# Patient Record
Sex: FEMALE | Race: WHITE | NOT HISPANIC OR LATINO | Employment: FULL TIME | URBAN - METROPOLITAN AREA
[De-identification: names, ages, dates, MRNs, and addresses within clinical notes are randomized per-mention and may not be internally consistent; named-entity substitution may affect disease eponyms.]

---

## 2017-04-03 ENCOUNTER — TELEPHONE (OUTPATIENT)
Dept: NURSING | Facility: CLINIC | Age: 22
End: 2017-04-03

## 2017-04-03 DIAGNOSIS — N94.6 DYSMENORRHEA: ICD-10-CM

## 2017-04-03 RX ORDER — DESOGESTREL AND ETHINYL ESTRADIOL 0.15-0.03
KIT ORAL
Qty: 112 TABLET | Refills: 2 | Status: SHIPPED | OUTPATIENT
Start: 2017-04-03 | End: 2018-01-04

## 2017-04-03 NOTE — TELEPHONE ENCOUNTER
Pt calling she is trying to fill her OCP but her pharmacy is indicating it's too early. Pt is due to start a new pack today, she takes it continuously. Rx was sent on 11/10/16 for Apri 84 tabs/4rf, no note of taking continuously or 4 pack amount quantity (112 tabs) on Rx. Informed pt I would call to get it changed, pt verbalized understanding. Called pharmacy and spoke with Pharmacist (Sahil) who stated she would deactivate the Rx (the 11/10/16 one). New Rx sent to pharmacy for Apri, take one tablet by mouth once daily in a continuous fashion, 112 tabs/2rf (1 refill would only get her to October 2017 and she is not due for annual until November 2017).     POC at annual (11/10/16) with Dr. Rice:  Will start continuous birth control with current OCP; extra refill provided      Closing encounter.

## 2018-01-04 DIAGNOSIS — N94.6 DYSMENORRHEA: ICD-10-CM

## 2018-01-04 RX ORDER — DESOGESTREL AND ETHINYL ESTRADIOL 0.15-0.03
KIT ORAL
Qty: 28 TABLET | Refills: 0 | Status: SHIPPED | OUTPATIENT
Start: 2018-01-04 | End: 2018-02-08

## 2018-01-04 NOTE — TELEPHONE ENCOUNTER
APRI 0.15-30      Last Written Prescription Date:  4/3/17  Last Fill Quantity: 112,   # refills: 2  Last Office Visit: 11/10/16  Future Office visit:   none    Medication is being filled for 1 time refill only due to:  Patient needs to be seen because it has been more than one year since last visit.   Pt due for annual, no appt scheduled. One month extension sent per [facility] protocol.

## 2018-02-08 DIAGNOSIS — N94.6 DYSMENORRHEA: ICD-10-CM

## 2018-02-08 RX ORDER — DESOGESTREL AND ETHINYL ESTRADIOL 0.15-0.03
KIT ORAL
Qty: 84 TABLET | Refills: 0 | Status: SHIPPED | OUTPATIENT
Start: 2018-02-08 | End: 2018-02-23

## 2018-02-08 NOTE — TELEPHONE ENCOUNTER
APRI 0.15-30      Last Written Prescription Date:  1/4/18  Last Fill Quantity: 28,   # refills: 0  Last Office Visit: 11/10/16  Future Office visit:    Next 5 appointments (look out 90 days)     Feb 23, 2018  1:50 PM CST   PHYSICAL with Valarie Rice MD   [facility] [place] ([facility])    [address] [address]   [phone]                   Medication is being filled for 1 time refill only due to:  Patient needs to be seen because it has been more than one year since last visit.   Pt due for annual, appt scheduled,3 month supply sent for insurance purposes.

## 2018-02-23 ENCOUNTER — OFFICE VISIT (OUTPATIENT)
Dept: OBGYN | Facility: CLINIC | Age: 23
End: 2018-02-23
Payer: COMMERCIAL

## 2018-02-23 VITALS
HEIGHT: 67 IN | DIASTOLIC BLOOD PRESSURE: 68 MMHG | SYSTOLIC BLOOD PRESSURE: 110 MMHG | BODY MASS INDEX: 17.42 KG/M2 | WEIGHT: 111 LBS

## 2018-02-23 DIAGNOSIS — N94.6 DYSMENORRHEA: ICD-10-CM

## 2018-02-23 DIAGNOSIS — N60.02 BREAST CYST, LEFT: ICD-10-CM

## 2018-02-23 DIAGNOSIS — R63.6 UNDERWEIGHT: ICD-10-CM

## 2018-02-23 DIAGNOSIS — Z86.59 HISTORY OF EATING DISORDER: ICD-10-CM

## 2018-02-23 DIAGNOSIS — Z01.419 ENCOUNTER FOR GYNECOLOGICAL EXAMINATION WITHOUT ABNORMAL FINDING: Primary | ICD-10-CM

## 2018-02-23 PROCEDURE — 99395 PREV VISIT EST AGE 18-39: CPT | Performed by: OBSTETRICS & GYNECOLOGY

## 2018-02-23 RX ORDER — DESOGESTREL AND ETHINYL ESTRADIOL 0.15-0.03
KIT ORAL
Qty: 84 TABLET | Refills: 4 | Status: SHIPPED | OUTPATIENT
Start: 2018-02-23 | End: 2019-03-25

## 2018-02-23 ASSESSMENT — ANXIETY QUESTIONNAIRES
3. WORRYING TOO MUCH ABOUT DIFFERENT THINGS: SEVERAL DAYS
1. FEELING NERVOUS, ANXIOUS, OR ON EDGE: NOT AT ALL
5. BEING SO RESTLESS THAT IT IS HARD TO SIT STILL: NOT AT ALL
2. NOT BEING ABLE TO STOP OR CONTROL WORRYING: NOT AT ALL
IF YOU CHECKED OFF ANY PROBLEMS ON THIS QUESTIONNAIRE, HOW DIFFICULT HAVE THESE PROBLEMS MADE IT FOR YOU TO DO YOUR WORK, TAKE CARE OF THINGS AT HOME, OR GET ALONG WITH OTHER PEOPLE: NOT DIFFICULT AT ALL
7. FEELING AFRAID AS IF SOMETHING AWFUL MIGHT HAPPEN: NOT AT ALL
GAD7 TOTAL SCORE: 1
6. BECOMING EASILY ANNOYED OR IRRITABLE: NOT AT ALL

## 2018-02-23 ASSESSMENT — PATIENT HEALTH QUESTIONNAIRE - PHQ9: 5. POOR APPETITE OR OVEREATING: NOT AT ALL

## 2018-02-23 NOTE — MR AVS SNAPSHOT
After Visit Summary   2/23/2018    Rani Guan    MRN: [id number]           Patient Information     Date Of Birth          1995        Visit Information        Provider Department      2/23/2018 1:50 PM Valarie Rice MD [facility] [place]        Today's Diagnoses     Encounter for gynecological examination without abnormal finding    -  1    Dysmenorrhea        Breast cyst, left        Underweight        History of eating disorder          Care Instructions    Follow up with your primary care provider for your other medical problems.  Continue self breast exam.  Usual safety and preventative measures counseling done.  Weight gain encouraged.  Long discussion today regarding weight trend in the last 15 months.  Discussed history of eating disorder in the past and recommend reaching back out to [facility] regarding weight and healthy nutrition to balance increase in activity/exercise.  Last pap smear (2016) was normal.  No pap was obtained this year.  This was discussed with the patient and she agrees with the plan.  Left breast ultrasound ordered for breast cyst.          Follow-ups after your visit        Follow-up notes from your care team     Return in about 1 year (around 2/23/2019) for Annual Exam.      Future tests that were ordered for you today     Open Future Orders        Priority Expected Expires Ordered    US Breast Left Limited 1-3 Quadrants Routine  2/23/2019 2/23/2018            Who to contact     If you have questions or need follow up information about today's clinic visit or your schedule please contact [facility] [address] [facility] [place] directly at [phone].  Normal or non-critical lab and imaging results will be communicated to you by MyChart, letter or phone within 4 business days after the clinic has received the results. If you do not hear from us within 7 days, please contact the clinic through MyChart or phone. If you have a critical or abnormal lab  "result, we will notify you by phone as soon as possible.  Submit refill requests through [email or web address] or call your pharmacy and they will forward the refill request to us. Please allow 3 business days for your refill to be completed.          Additional Information About Your Visit        [employer] Information     [email or web address] lets you send messages to your doctor, view your test results, renew your prescriptions, schedule appointments and more. To sign up, go to [email or web address] . Click on \"Log in\" on the left side of the screen, which will take you to the Welcome page. Then click on \"Sign up Now\" on the right side of the page.     You will be asked to enter the access code listed below, as well as some personal information. Please follow the directions to create your username and password.     Your access code is: [email or web address]  Expires: 2018  2:38 PM     Your access code will  in 90 days. If you need help or a new code, please call your [place] clinic or [phone].        Care EveryWhere ID     This is your Care EveryWhere ID. This could be used by other organizations to access your [place] medical records  [id number]        Your Vitals Were     Height Last Period Breastfeeding? BMI (Body Mass Index)          5' 6.5\" (1.689 m) 2018 (Approximate) No 17.65 kg/m2         Blood Pressure from Last 3 Encounters:   18 110/68   11/10/16 120/74   16 102/68    Weight from Last 3 Encounters:   18 111 lb (50.3 kg)   11/10/16 149 lb (67.6 kg)   16 150 lb (68 kg)                 Today's Medication Changes          These changes are accurate as of 18  2:38 PM.  If you have any questions, ask your nurse or doctor.               These medicines have changed or have updated prescriptions.        Dose/Directions    desogestrel-ethinyl estradiol 0.15-30 MG-MCG per tablet   Commonly known as:  APRI   This may have changed:  See the new instructions.   Used for:  Dysmenorrhea   Changed " by:  Valarie Rice MD        TAKE 1 TABLET BY MOUTH ONCE DAILY IN A CONTINUOUS FASHION   Quantity:  84 tablet   Refills:  4            Where to get your medicines      These medications were sent to [facility]/pharmacy #[id number] - [place] - [address]  [address] [place] [address]     Phone:  [phone]     desogestrel-ethinyl estradiol 0.15-30 MG-MCG per tablet                Primary Care Provider Office Phone # Fax #    [facility] [place] [facility] [phone]       [facility] [address] [facility] [address]  [facility] [address]        Equal Access to Services     MACKENZIE GUTIERREZ : Hadii aad ku hadasho Socalixto, waaxda luqadaha, qaybta kaalmada adekianayada, michael hood. So [facility] [phone].    ATENCIÓN: Si habla español, tiene a matias disposición servicios gratuitos de asistencia lingüística. [facility] [phone].    We comply with applicable federal civil rights laws and [place] laws. We do not discriminate on the basis of race, color, national origin, age, disability, sex, sexual orientation, or gender identity.            Thank you!     Thank you for choosing [facility] [place]  for your care. Our goal is always to provide you with excellent care. Hearing back from our patients is one way we can continue to improve our services. Please take a few minutes to complete the written survey that you may receive in the mail after your visit with us. Thank you!             Your Updated Medication List - Protect others around you: Learn how to safely use, store and throw away your medicines at www.disposemymeds.org.          This list is accurate as of 2/23/18  2:38 PM.  Always use your most recent med list.                   Brand Name Dispense Instructions for use Diagnosis    desogestrel-ethinyl estradiol 0.15-30 MG-MCG per tablet    APRI    84 tablet    TAKE 1 TABLET BY MOUTH ONCE DAILY IN A CONTINUOUS FASHION    Dysmenorrhea

## 2018-02-23 NOTE — PATIENT INSTRUCTIONS
Follow up with your primary care provider for your other medical problems.  Continue self breast exam.  Usual safety and preventative measures counseling done.  Weight gain encouraged.  Long discussion today regarding weight trend in the last 15 months.  Discussed history of eating disorder in the past and recommend reaching back out to [facility] regarding weight and healthy nutrition to balance increase in activity/exercise.  Last pap smear (2016) was normal.  No pap was obtained this year.  This was discussed with the patient and she agrees with the plan.  Left breast ultrasound ordered for breast cyst.

## 2018-02-23 NOTE — PROGRESS NOTES
Rani is a 22 year old  female who presents for annual exam.     Besides routine health maintenance, she has no other health concerns today .    HPI:  Here today for yearly exam --doing well.  Regular, monthly menses x 5-6d on apri OCP.  Heavier bleeding for first couple of days and then tapers.  No intermenstrual bleeding/spotting.  No cramping.  +SA --no issues.   Denies bowel/bladder issues    Dating (boyfriend x 2yrs); senior year at [employer]!!  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  Parents/family have been concerned.  Has recently agreed to scale back on exercise/yoga sculpt.  Eating 3 meals per day --usually protein bar/yogurt for breakfast, niko with turkey, mustard, veggies for lunch and random for dinner --usually some sort of protein  -has considered re-visiting [facility] for nutrition therapy  -no SBE; unsure of how long breast lump has been present; no pain/tenderness  -declines STD screening today      GYNECOLOGIC HISTORY:    Patient's last menstrual period was 2018 (approximate).  Her current contraception method is: oral contraceptives.  She  reports that she has never smoked. She has never used smokeless tobacco.    Patient is sexually active.  STD testing offered?  Declined  Last PHQ-9 score on record =   PHQ-9 SCORE 2018   Total Score 1     Last GAD7 score on record =   NICOLE-7 SCORE 2018   Total Score 1     Alcohol Score = 5    HEALTH MAINTENANCE:  Cholesterol: (No results found for: CHOL NA  Last Mammo: NA, Result: not applicable, Next Mammo: NA   Pap: (  Lab Results   Component Value Date    PAP NIL 11/10/2016    11/10/16 WNL. yeast  Colonoscopy:  NA, Result: not applicable, Next Colonoscopy: NA years.  Dexa:   "NA    Health maintenance updated:  yes    HISTORY:  Obstetric History       T0      L0     SAB0   TAB0   Ectopic0   Multiple0   Live Births0           Patient Active Problem List   Diagnosis     Uses oral contraception     History of eating disorder     Underweight     Breast cyst, left     Past Surgical History:   Procedure Laterality Date     NO HISTORY OF SURGERY        Social History   Substance Use Topics     Smoking status: Never Smoker     Smokeless tobacco: Never Used     Alcohol use Not on file      Problem (# of Occurrences) Relation (Name,Age of Onset)    Hypertension (1) Other            Current Outpatient Prescriptions   Medication Sig     desogestrel-ethinyl estradiol (APRI) 0.15-30 MG-MCG per tablet TAKE 1 TABLET BY MOUTH ONCE DAILY IN A CONTINUOUS FASHION     [DISCONTINUED] APRI 0.15-30 MG-MCG per tablet TAKE 1 TABLET BY MOUTH ONCE DAILY IN A CONTINUOUS FASHION     No current facility-administered medications for this visit.      No Known Allergies    Past medical, surgical, social and family histories were reviewed and updated in EPIC.    ROS:   12 point review of systems negative other than symptoms noted below.    EXAM:  /68  Ht 5' 6.5\" (1.689 m)  Wt 111 lb (50.3 kg)  LMP 2018 (Approximate)  Breastfeeding? No  BMI 17.65 kg/m2   BMI: Body mass index is 17.65 kg/(m^2).    PHYSICAL EXAM:  Constitutional:  Appearance: Well nourished, well developed, alert, in no acute distress  Neck:  Lymph Nodes:  No lymphadenopathy present    Thyroid:  Gland size normal, nontender, no nodules or masses present  on palpation  Chest:  Respiratory Effort:  Breathing unlabored  Cardiovascular:    Heart: Auscultation:  Regular rate, normal rhythm, no murmurs present  Breasts: Inspection of Breasts:  No lymphadenopathy present., Palpation of Breasts and Axillae:  No masses present on palpation, no breast tenderness., Axillary Lymph Nodes:  No lymphadenopathy present. and No nodularity, " asymmetry or nipple discharge bilaterally.+2-3CM WELL CIRCUMSCRIBED, MOBILE MASS IN LEFT BREAST AT 5-6O'CLOCK  Gastrointestinal:   Abdominal Examination:  Abdomen nontender to palpation, tone normal without rigidity or guarding, no masses present, umbilicus without lesions   Liver and Spleen:  No hepatomegaly present, liver nontender to palpation    Hernias:  No hernias present  Lymphatic: Lymph Nodes:  No other lymphadenopathy present  Skin:  General Inspection:  No rashes present, no lesions present, no areas of  discoloration    Genitalia and Groin:  No rashes present, no lesions present, no areas of  discoloration, no masses present  Neurologic/Psychiatric:    Mental Status:  Oriented X3     Pelvic Exam:  External Genitalia:     Normal appearance for age, no discharge present, no tenderness present, no inflammatory lesions present, color normal  Vagina:     Normal vaginal vault without central or paravaginal defects, no discharge present, no inflammatory lesions present, no masses present  Bladder:     Nontender to palpation  Urethra:   Urethral Body:  Urethra palpation normal, urethra structural support normal   Urethral Meatus:  No erythema or lesions present  Cervix:     Appearance healthy, no lesions present, nontender to palpation, no bleeding present  Uterus:     Uterus: firm, normal sized and nontender, anteverted in position.   Adnexa:     No adnexal tenderness present, no adnexal masses present  Perineum:     Perineum within normal limits, no evidence of trauma, no rashes or skin lesions present  Anus:     Anus within normal limits, no hemorrhoids present  Inguinal Lymph Nodes:     No lymphadenopathy present  Pubic Hair:     Normal pubic hair distribution for age  Genitalia and Groin:     No rashes present, no lesions present, no areas of discoloration, no masses present      COUNSELING:   Reviewed preventive health counseling, as reflected in patient instructions  Special attention given to:         Regular exercise       Healthy diet/nutrition       Contraception    BMI: Body mass index is 17.65 kg/(m^2).      ASSESSMENT:  22 year old female with satisfactory annual exam.    ICD-10-CM    1. Encounter for gynecological examination without abnormal finding Z01.419    2. Dysmenorrhea N94.6 desogestrel-ethinyl estradiol (APRI) 0.15-30 MG-MCG per tablet   3. Breast cyst, left N60.02 US Breast Left Limited 1-3 Quadrants   4. Underweight R63.6    5. History of eating disorder Z86.59        PLAN:  Patient Instructions   Follow up with your primary care provider for your other medical problems.  Continue self breast exam.  Usual safety and preventative measures counseling done.  Weight gain encouraged.  Long discussion today regarding weight trend in the last 15 months.  Discussed history of eating disorder in the past and recommend reaching back out to [facility] regarding weight and healthy nutrition to balance increase in activity/exercise.  Last pap smear (2016) was normal.  No pap was obtained this year.  This was discussed with the patient and she agrees with the plan.  Left breast ultrasound ordered for breast cyst.      Valarie Rice MD

## 2018-02-24 ASSESSMENT — PATIENT HEALTH QUESTIONNAIRE - PHQ9: SUM OF ALL RESPONSES TO PHQ QUESTIONS 1-9: 1

## 2018-02-24 ASSESSMENT — ANXIETY QUESTIONNAIRES: GAD7 TOTAL SCORE: 1

## 2018-03-01 ENCOUNTER — HOSPITAL ENCOUNTER (OUTPATIENT)
Dept: MAMMOGRAPHY | Facility: CLINIC | Age: 23
Discharge: HOME | End: 2018-03-01
Attending: OBSTETRICS & GYNECOLOGY | Admitting: OBSTETRICS & GYNECOLOGY
Payer: COMMERCIAL

## 2018-03-01 DIAGNOSIS — Z23 NEED FOR PROPHYLACTIC VACCINATION AND INOCULATION AGAINST INFLUENZA: Primary | ICD-10-CM

## 2018-03-01 DIAGNOSIS — N60.02 BREAST CYST, LEFT: ICD-10-CM

## 2018-03-01 PROCEDURE — 90686 IIV4 VACC NO PRSV 0.5 ML IM: CPT

## 2018-03-01 PROCEDURE — 76642 ULTRASOUND BREAST LIMITED: CPT | Mod: LT

## 2018-03-01 PROCEDURE — 90471 IMMUNIZATION ADMIN: CPT

## 2018-03-01 NOTE — PROGRESS NOTES

## 2018-03-08 ENCOUNTER — HOSPITAL ENCOUNTER (OUTPATIENT)
Dept: MAMMOGRAPHY | Facility: CLINIC | Age: 23
Discharge: HOME | End: 2018-03-08
Attending: OBSTETRICS & GYNECOLOGY | Admitting: OBSTETRICS & GYNECOLOGY
Payer: COMMERCIAL

## 2018-03-08 DIAGNOSIS — N60.02 BREAST CYST, LEFT: ICD-10-CM

## 2018-03-08 PROCEDURE — 88305 TISSUE EXAM BY PATHOLOGIST: CPT | Performed by: OBSTETRICS & GYNECOLOGY

## 2018-03-08 PROCEDURE — 25000125 ZZHC RX 250: Performed by: OBSTETRICS & GYNECOLOGY

## 2018-03-08 PROCEDURE — 88305 TISSUE EXAM BY PATHOLOGIST: CPT | Mod: 26 | Performed by: OBSTETRICS & GYNECOLOGY

## 2018-03-08 PROCEDURE — 27210206 US BREAST BIOPSY CORE NEEDLE LEFT

## 2018-03-08 RX ADMIN — LIDOCAINE HYDROCHLORIDE 5 ML: 10 INJECTION, SOLUTION INFILTRATION; PERINEURAL at 14:10

## 2018-03-08 NOTE — DISCHARGE INSTRUCTIONS

## 2018-03-09 LAB — COPATH REPORT: NORMAL

## 2018-03-09 NOTE — PROGRESS NOTES
Pathology results correlated by radiologist, Dr. Migdalia Chung.    Call placed to patient.  verified. 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 No evidence of malignancy. Clinical follow up recommended. Patient verbalized understanding. Patient reports minimal pain and minor bruising at biopsy site. Encouraged patient to apply ice to site as needed. Both parties in agreement of above plan.    Helena Sarah RN, BSN  Nurse Navigator   [facility]  [phone]

## 2019-03-25 DIAGNOSIS — N94.6 DYSMENORRHEA: ICD-10-CM

## 2019-03-25 RX ORDER — DESOGESTREL AND ETHINYL ESTRADIOL 0.15-0.03
KIT ORAL
Qty: 28 TABLET | Refills: 0 | Status: SHIPPED | OUTPATIENT
Start: 2019-03-25 | End: 2019-04-22

## 2019-03-25 NOTE — TELEPHONE ENCOUNTER
"Requested Prescriptions   Pending Prescriptions Disp Refills     desogestrel-ethinyl estradiol (APRI) 0.15-30 MG-MCG tablet [Pharmacy Med Name: APRI 28 DAY TABLET] 84 tablet 3     Sig: TAKE 1 TABLET BY MOUTH ONCE DAILY IN A CONTINUOUS FASHION    Contraceptives Protocol Failed - 3/25/2019  1:28 AM       Failed - Recent (12 mo) or future (30 days) visit within the authorizing provider's specialty    Patient had office visit in the last 12 months or has a visit in the next 30 days with authorizing provider or within the authorizing provider's specialty.  See \"Patient Info\" tab in inbasket, or \"Choose Columns\" in Meds & Orders section of the refill encounter.             Passed - Patient is not a current smoker if age is 35 or older       Passed - Medication is active on med list       Passed - No active pregnancy on record       Passed - No positive pregnancy test in past 12 months        Medication is being filled for 1 time refill only due to:  pt needs an appointment for further refills   Valorie Mcmahan RN on 3/25/2019 at 8:45 AM      "

## 2019-04-13 DIAGNOSIS — N94.6 DYSMENORRHEA: ICD-10-CM

## 2019-04-15 RX ORDER — DESOGESTREL AND ETHINYL ESTRADIOL 0.15-0.03
KIT ORAL
Qty: 28 TABLET | Refills: 0 | OUTPATIENT
Start: 2019-04-15

## 2019-04-15 NOTE — TELEPHONE ENCOUNTER
"Requested Prescriptions   Pending Prescriptions Disp Refills     desogestrel-ethinyl estradiol (APRI) 0.15-30 MG-MCG tablet [Pharmacy Med Name: APRI 28 DAY TABLET] 28 tablet 0     Sig: TAKE 1 TABLET BY MOUTH ONCE DAILY IN A CONTINUOUS FASHION       Contraceptives Protocol Failed - 4/13/2019  8:24 AM        Failed - Recent (12 mo) or future (30 days) visit within the authorizing provider's specialty     Patient had office visit in the last 12 months or has a visit in the next 30 days with authorizing provider or within the authorizing provider's specialty.  See \"Patient Info\" tab in inbasket, or \"Choose Columns\" in Meds & Orders section of the refill encounter.              Passed - Patient is not a current smoker if age is 35 or older        Passed - Medication is active on med list        Passed - No active pregnancy on record        Passed - No positive pregnancy test in past 12 months        Pt due for annual, no appt scheduled. Pt already received one month extension. Rx denied.   Valorie Mcmahan RN on 4/15/2019 at 8:39 AM    "

## 2019-04-17 DIAGNOSIS — N94.6 DYSMENORRHEA: ICD-10-CM

## 2019-04-17 RX ORDER — DESOGESTREL AND ETHINYL ESTRADIOL 0.15-0.03
KIT ORAL
Qty: 28 TABLET | Refills: 0 | OUTPATIENT
Start: 2019-04-17

## 2019-04-17 NOTE — TELEPHONE ENCOUNTER
"Requested Prescriptions   Pending Prescriptions Disp Refills     desogestrel-ethinyl estradiol (APRI) 0.15-30 MG-MCG tablet [Pharmacy Med Name: APRI 28 DAY TABLET] 28 tablet 0     Sig: TAKE 1 TABLET BY MOUTH ONCE DAILY IN A CONTINUOUS FASHION       Contraceptives Protocol Failed - 4/17/2019  3:36 PM        Failed - Recent (12 mo) or future (30 days) visit within the authorizing provider's specialty     Patient had office visit in the last 12 months or has a visit in the next 30 days with authorizing provider or within the authorizing provider's specialty.  See \"Patient Info\" tab in inbasket, or \"Choose Columns\" in Meds & Orders section of the refill encounter.              Passed - Patient is not a current smoker if age is 35 or older        Passed - Medication is active on med list        Passed - No active pregnancy on record        Passed - No positive pregnancy test in past 12 months        Pt due for annual, no appt scheduled. Pt already received one month extension. Rx denied.   Valorie Mcmahan RN on 4/17/2019 at 3:38 PM    "

## 2019-04-22 DIAGNOSIS — N94.6 DYSMENORRHEA: ICD-10-CM

## 2019-04-23 RX ORDER — DESOGESTREL AND ETHINYL ESTRADIOL 0.15-0.03
KIT ORAL
Qty: 112 TABLET | Refills: 0 | Status: SHIPPED | OUTPATIENT
Start: 2019-04-23 | End: 2019-06-07

## 2019-04-23 NOTE — TELEPHONE ENCOUNTER
"Requested Prescriptions   Pending Prescriptions Disp Refills     desogestrel-ethinyl estradiol (APRI) 0.15-30 MG-MCG tablet [Pharmacy Med Name: APRI 28 DAY TABLET] 28 tablet 0     Sig: TAKE 1 TABLET BY MOUTH ONCE DAILY IN A CONTINUOUS FASHION       Contraceptives Protocol Passed - 4/22/2019 10:15 AM        Passed - Patient is not a current smoker if age is 35 or older        Passed - Recent (12 mo) or future (30 days) visit within the authorizing provider's specialty     Patient had office visit in the last 12 months or has a visit in the next 30 days with authorizing provider or within the authorizing provider's specialty.  See \"Patient Info\" tab in inbasket, or \"Choose Columns\" in Meds & Orders section of the refill encounter.              Passed - Medication is active on med list        Passed - No active pregnancy on record        Passed - No positive pregnancy test in past 12 months        Last Written Prescription Date:  3/25/19  Last Fill Quantity: 28,  # refills: 0   Last office visit: 2/23/2018 with prescribing provider:  Valarie Rice   Future Office Visit:   Next 5 appointments (look out 90 days)    May 08, 2019  2:10 PM CDT  PHYSICAL with Valarie Rice MD  [facility] [place] ([facility]) [address] [place] [id number] [phone]         Prescription approved per [facility] Refill Protocol.  Valorie Mcmahan RN on 4/23/2019 at 8:57 AM      "

## 2019-06-04 NOTE — PROGRESS NOTES
Rani is a 23 year old  female who presents for annual exam.     Besides routine health maintenance, she has no other health concerns today .    HPI:  Here today for yearly exam --doing well today.  Regular, monthly menses x 6-7d.  Heavier bleeding and some cramping in the first 3-4d.  No breakthrough bleeding/spotting.  Would like to switch back to continuous OCP use again.  +SA --no issues.   Denies bowel/bladder issues.    Dating --boyfriend x 5yrs; living together in [place]; working as  for [employer]  -staying active; working out at [employer] 4-5d/wk; works parttime so has free membership; weight stable this year and feeling good  +SBE --stable left breast lump; s/p US and biopsy last year c/w fibroadenoma      GYNECOLOGIC HISTORY:    Patient's last menstrual period was 2019.  Her current contraception method is: oral contraceptives.  She  reports that she has never smoked. She has never used smokeless tobacco.    Patient is sexually active.  STD testing offered?  Declined  Last PHQ-9 score on record =   PHQ-9 SCORE 2019   PHQ-9 Total Score 0     Last GAD7 score on record =   NICOLE-7 SCORE 2019   Total Score 1     Alcohol Score = 4    HEALTH MAINTENANCE:  Cholesterol: (No results found for: CHOL not fasting  Last Mammo: Never, Result: not applicable, Next Mammo: age 40 - US left breast/biopsy - Fibroadenoma  Pap:   Lab Results   Component Value Date    PAP NIL 11/10/2016      Colonoscopy:  Never, Result: not applicable, Next Colonoscopy: age 50 years.  Dexa:  Never    Health maintenance updated:  no, Pap due    HISTORY:  OB History    Para Term  AB Living   0 0 0 0 0 0   SAB TAB Ectopic Multiple Live Births   0 0 0 0 0       Patient Active Problem List   Diagnosis     Uses oral contraception     History of eating disorder     Underweight     Breast cyst, left     Past Surgical History:   Procedure Laterality Date     BREAST BIOPSY, CORE RT/LT Left 2018     "fibroadenoma      Social History     Tobacco Use     Smoking status: Never Smoker     Smokeless tobacco: Never Used   Substance Use Topics     Alcohol use: Yes     Alcohol/week: 0.0 oz      Problem (# of Occurrences) Relation (Name,Age of Onset)    Hypertension (1) Other            Current Outpatient Medications   Medication Sig     desogestrel-ethinyl estradiol (APRI) 0.15-30 MG-MCG tablet TAKE 1 TABLET BY MOUTH ONCE DAILY IN A CONTINUOUS FASHION     No current facility-administered medications for this visit.      No Known Allergies    Past medical, surgical, social and family histories were reviewed and updated in EPIC.    ROS:   12 point review of systems negative other than symptoms noted below.  Head: Sore Throat  Respiratory: Cough  Genitourinary: Heavy Bleeding with Period  Skin: Acne    EXAM:  /56   Pulse 60   Temp 98.7  F (37.1  C) (Oral)   Ht 1.689 m (5' 6.5\")   Wt 52.7 kg (116 lb 3.2 oz)   LMP 05/20/2019   BMI 18.47 kg/m     BMI: Body mass index is 18.47 kg/m .    PHYSICAL EXAM:  Constitutional:  Appearance: Well nourished, well developed, alert, in no acute distress  Neck:  Lymph Nodes:  No lymphadenopathy present    Thyroid:  Gland size normal, nontender, no nodules or masses present  on palpation  Chest:  Respiratory Effort:  Breathing unlabored  Cardiovascular:    Heart: Auscultation:  Regular rate, normal rhythm, no murmurs present  Breasts: Inspection of Breasts:  No lymphadenopathy present., Palpation of Breasts and Axillae:  No masses present on palpation, no breast tenderness., Axillary Lymph Nodes:  No lymphadenopathy present. and No nodularity, asymmetry or nipple discharge bilaterally.  +PALPABLE 2CM SMOOTH, MOBILE MASS AT 6 OCLOCK ON LEFT BREAST  Gastrointestinal:   Abdominal Examination:  Abdomen nontender to palpation, tone normal without rigidity or guarding, no masses present, umbilicus without lesions   Liver and Spleen:  No hepatomegaly present, liver nontender to " palpation    Hernias:  No hernias present  Lymphatic: Lymph Nodes:  No other lymphadenopathy present  Skin:  General Inspection:  No rashes present, no lesions present, no areas of  discoloration    Genitalia and Groin:  No rashes present, no lesions present, no areas of  discoloration, no masses present  Neurologic/Psychiatric:    Mental Status:  Oriented X3     Pelvic Exam:  External Genitalia:     Normal appearance for age, no discharge present, no tenderness present, no inflammatory lesions present, color normal  Vagina:     Normal vaginal vault without central or paravaginal defects, no discharge present, no inflammatory lesions present, no masses present  Bladder:     Nontender to palpation  Urethra:   Urethral Body:  Urethra palpation normal, urethra structural support normal   Urethral Meatus:  No erythema or lesions present  Cervix:     Appearance healthy, no lesions present, nontender to palpation, no bleeding present  Uterus:     Uterus: firm, normal sized and nontender, anteverted in position.   Adnexa:     No adnexal tenderness present, no adnexal masses present  Perineum:     Perineum within normal limits, no evidence of trauma, no rashes or skin lesions present  Anus:     Anus within normal limits, no hemorrhoids present  Inguinal Lymph Nodes:     No lymphadenopathy present  Pubic Hair:     Normal pubic hair distribution for age  Genitalia and Groin:     No rashes present, no lesions present, no areas of discoloration, no masses present      COUNSELING:   Reviewed preventive health counseling, as reflected in patient instructions  Special attention given to:        Regular exercise       Healthy diet/nutrition       Contraception    BMI: Body mass index is 18.47 kg/m .      ASSESSMENT:  23 year old female with satisfactory annual exam.    ICD-10-CM    1. Encounter for gynecological examination without abnormal finding Z01.419    2. Screening for cervical cancer Z12.4 Pap imaged thin layer screen only  - recommended age 21 - 24 years   3. Dysmenorrhea N94.6 desogestrel-ethinyl estradiol (APRI) 0.15-30 MG-MCG tablet   4. Uses oral contraception Z30.41        PLAN:  Patient Instructions   Follow up with your primary care provider for your other medical problems.  Continue self breast exam.  Increase physical activity and exercise.  Lab and pap smear results will be called to the patient.  Pap smear done today and will repeat in 3yrs if negative.  Usual safety and preventative measures counseling done.      Valarie Rice MD

## 2019-06-07 ENCOUNTER — OFFICE VISIT (OUTPATIENT)
Dept: OBGYN | Facility: CLINIC | Age: 24
End: 2019-06-07
Payer: COMMERCIAL

## 2019-06-07 VITALS
DIASTOLIC BLOOD PRESSURE: 56 MMHG | HEIGHT: 67 IN | TEMPERATURE: 98.7 F | BODY MASS INDEX: 18.24 KG/M2 | WEIGHT: 116.2 LBS | HEART RATE: 60 BPM | SYSTOLIC BLOOD PRESSURE: 102 MMHG

## 2019-06-07 DIAGNOSIS — Z30.41 USES ORAL CONTRACEPTION: ICD-10-CM

## 2019-06-07 DIAGNOSIS — Z12.4 SCREENING FOR CERVICAL CANCER: ICD-10-CM

## 2019-06-07 DIAGNOSIS — Z01.419 ENCOUNTER FOR GYNECOLOGICAL EXAMINATION WITHOUT ABNORMAL FINDING: Primary | ICD-10-CM

## 2019-06-07 DIAGNOSIS — N94.6 DYSMENORRHEA: ICD-10-CM

## 2019-06-07 PROCEDURE — 99395 PREV VISIT EST AGE 18-39: CPT | Performed by: OBSTETRICS & GYNECOLOGY

## 2019-06-07 PROCEDURE — G0145 SCR C/V CYTO,THINLAYER,RESCR: HCPCS | Performed by: OBSTETRICS & GYNECOLOGY

## 2019-06-07 RX ORDER — DESOGESTREL AND ETHINYL ESTRADIOL 0.15-0.03
KIT ORAL
Qty: 112 TABLET | Refills: 5 | Status: SHIPPED | OUTPATIENT
Start: 2019-06-07 | End: 2020-07-13

## 2019-06-07 ASSESSMENT — PATIENT HEALTH QUESTIONNAIRE - PHQ9
SUM OF ALL RESPONSES TO PHQ QUESTIONS 1-9: 0
5. POOR APPETITE OR OVEREATING: NOT AT ALL

## 2019-06-07 ASSESSMENT — ANXIETY QUESTIONNAIRES
5. BEING SO RESTLESS THAT IT IS HARD TO SIT STILL: NOT AT ALL
IF YOU CHECKED OFF ANY PROBLEMS ON THIS QUESTIONNAIRE, HOW DIFFICULT HAVE THESE PROBLEMS MADE IT FOR YOU TO DO YOUR WORK, TAKE CARE OF THINGS AT HOME, OR GET ALONG WITH OTHER PEOPLE: NOT DIFFICULT AT ALL
6. BECOMING EASILY ANNOYED OR IRRITABLE: NOT AT ALL
GAD7 TOTAL SCORE: 1
1. FEELING NERVOUS, ANXIOUS, OR ON EDGE: NOT AT ALL
3. WORRYING TOO MUCH ABOUT DIFFERENT THINGS: SEVERAL DAYS
7. FEELING AFRAID AS IF SOMETHING AWFUL MIGHT HAPPEN: NOT AT ALL
2. NOT BEING ABLE TO STOP OR CONTROL WORRYING: NOT AT ALL

## 2019-06-07 ASSESSMENT — MIFFLIN-ST. JEOR: SCORE: 1306.77

## 2019-06-07 NOTE — PATIENT INSTRUCTIONS
Follow up with your primary care provider for your other medical problems.  Continue self breast exam.  Increase physical activity and exercise.  Lab and pap smear results will be called to the patient.  Pap smear done today and will repeat in 3yrs if negative.  Usual safety and preventative measures counseling done.

## 2019-06-08 ASSESSMENT — ANXIETY QUESTIONNAIRES: GAD7 TOTAL SCORE: 1

## 2019-06-12 LAB
COPATH REPORT: NORMAL
PAP: NORMAL

## 2019-06-18 ENCOUNTER — TELEPHONE (OUTPATIENT)
Dept: OBGYN | Facility: CLINIC | Age: 24
End: 2019-06-18

## 2019-06-18 NOTE — TELEPHONE ENCOUNTER
Returned pt call  Left detailed vm encouraging pt to be evaluated by a PCP or urgent care provider for tx.  Encouraged to call and speak w triage for any questions.

## 2019-06-18 NOTE — TELEPHONE ENCOUNTER
Pt called wanting to speak with a nurse of Dr. Rice. Pt states that she knows she has pink eye and wonders if Dr. Rice would be willing to write her a prescription for this. Please advise. Ok to lm on vm

## 2019-09-29 ENCOUNTER — HEALTH MAINTENANCE LETTER (OUTPATIENT)
Age: 24
End: 2019-09-29

## 2020-07-13 DIAGNOSIS — N94.6 DYSMENORRHEA: ICD-10-CM

## 2020-07-13 RX ORDER — DESOGESTREL AND ETHINYL ESTRADIOL 0.15-0.03
KIT ORAL
Qty: 28 TABLET | Refills: 1 | Status: SHIPPED | OUTPATIENT
Start: 2020-07-13 | End: 2020-08-11

## 2020-07-13 NOTE — TELEPHONE ENCOUNTER
"Requested Prescriptions   Pending Prescriptions Disp Refills     desogestrel-ethinyl estradiol (APRI) 0.15-30 MG-MCG tablet 112 tablet 5     Sig: TAKE 1 TABLET BY MOUTH ONCE DAILY IN A CONTINUOUS FASHION       Contraceptives Protocol Failed - 7/13/2020 10:48 AM        Failed - Recent (12 mo) or future (30 days) visit within the authorizing provider's specialty     Patient has had an office visit with the authorizing provider or a provider within the authorizing providers department within the previous 12 mos or has a future within next 30 days. See \"Patient Info\" tab in inbasket, or \"Choose Columns\" in Meds & Orders section of the refill encounter.              Passed - Patient is not a current smoker if age is 35 or older        Passed - Medication is active on med list        Passed - No active pregnancy on record        Passed - No positive pregnancy test in past 12 months           Last Written Prescription Date:  06/07/2019  Last Fill Quantity: 112,  # refills: 5   Last office visit: 6/7/2019 with prescribing provider:  Valarie Rice   Future Office Visit:  none  Refill sent  Appointment needed for further refills  Valorie Mcmahan RN on 7/13/2020 at 12:01 PM          "

## 2020-08-04 NOTE — TELEPHONE ENCOUNTER
rec'd message from pharmacy that refill needs to be 90 days for insurance to cover. Pharmacy pended.

## 2020-08-05 RX ORDER — DESOGESTREL AND ETHINYL ESTRADIOL 0.15-0.03
KIT ORAL
Qty: 28 TABLET | Refills: 1 | Status: CANCELLED | OUTPATIENT
Start: 2020-08-05

## 2020-08-11 DIAGNOSIS — N94.6 DYSMENORRHEA: ICD-10-CM

## 2020-08-11 RX ORDER — DESOGESTREL AND ETHINYL ESTRADIOL 0.15-0.03
KIT ORAL
Qty: 28 TABLET | Refills: 1 | Status: SHIPPED | OUTPATIENT
Start: 2020-08-11 | End: 2020-08-13

## 2020-08-11 NOTE — TELEPHONE ENCOUNTER
"Requested Prescriptions   Pending Prescriptions Disp Refills     desogestrel-ethinyl estradiol (APRI) 0.15-30 MG-MCG tablet 28 tablet 1     Sig: TAKE 1 TABLET BY MOUTH ONCE DAILY IN A CONTINUOUS FASHION       Contraceptives Protocol Failed - 8/11/2020  3:15 PM        Failed - Recent (12 mo) or future (30 days) visit within the authorizing provider's specialty     Patient has had an office visit with the authorizing provider or a provider within the authorizing providers department within the previous 12 mos or has a future within next 30 days. See \"Patient Info\" tab in inbasket, or \"Choose Columns\" in Meds & Orders section of the refill encounter.              Passed - Patient is not a current smoker if age is 35 or older        Passed - Medication is active on med list        Passed - No active pregnancy on record        Passed - No positive pregnancy test in past 12 months           Next 5 appointments (look out 90 days)    Sep 30, 2020  1:30 PM CDT  PHYSICAL with Valarie Rice MD  [facility] ([facility]) [address] [place] [id number]  [phone]        Prescription approved per [facility] Refill Protocol.  Valorie Mcmahan RN on 8/11/2020 at 3:27 PM    "

## 2020-08-13 RX ORDER — DESOGESTREL AND ETHINYL ESTRADIOL 0.15-0.03
KIT ORAL
Qty: 112 TABLET | Refills: 0 | Status: SHIPPED | OUTPATIENT
Start: 2020-08-13 | End: 2020-11-18

## 2020-11-18 ENCOUNTER — OFFICE VISIT (OUTPATIENT)
Dept: OBGYN | Facility: CLINIC | Age: 25
End: 2020-11-18
Payer: COMMERCIAL

## 2020-11-18 VITALS
SYSTOLIC BLOOD PRESSURE: 118 MMHG | HEIGHT: 67 IN | BODY MASS INDEX: 18.21 KG/M2 | DIASTOLIC BLOOD PRESSURE: 70 MMHG | HEART RATE: 64 BPM | WEIGHT: 116 LBS

## 2020-11-18 DIAGNOSIS — N94.6 DYSMENORRHEA: ICD-10-CM

## 2020-11-18 DIAGNOSIS — Z30.41 USES ORAL CONTRACEPTION: ICD-10-CM

## 2020-11-18 DIAGNOSIS — Z01.419 ENCOUNTER FOR GYNECOLOGICAL EXAMINATION WITHOUT ABNORMAL FINDING: Primary | ICD-10-CM

## 2020-11-18 DIAGNOSIS — Z23 NEED FOR PROPHYLACTIC VACCINATION AND INOCULATION AGAINST INFLUENZA: ICD-10-CM

## 2020-11-18 PROCEDURE — 90686 IIV4 VACC NO PRSV 0.5 ML IM: CPT | Performed by: OBSTETRICS & GYNECOLOGY

## 2020-11-18 PROCEDURE — 99395 PREV VISIT EST AGE 18-39: CPT | Mod: 25 | Performed by: OBSTETRICS & GYNECOLOGY

## 2020-11-18 PROCEDURE — 90471 IMMUNIZATION ADMIN: CPT | Performed by: OBSTETRICS & GYNECOLOGY

## 2020-11-18 RX ORDER — DESOGESTREL AND ETHINYL ESTRADIOL 0.15-0.03
KIT ORAL
Qty: 112 TABLET | Refills: 4 | Status: SHIPPED | OUTPATIENT
Start: 2020-11-18 | End: 2021-12-07

## 2020-11-18 ASSESSMENT — ANXIETY QUESTIONNAIRES
3. WORRYING TOO MUCH ABOUT DIFFERENT THINGS: SEVERAL DAYS
7. FEELING AFRAID AS IF SOMETHING AWFUL MIGHT HAPPEN: NOT AT ALL
2. NOT BEING ABLE TO STOP OR CONTROL WORRYING: NOT AT ALL
5. BEING SO RESTLESS THAT IT IS HARD TO SIT STILL: NOT AT ALL
1. FEELING NERVOUS, ANXIOUS, OR ON EDGE: NOT AT ALL
GAD7 TOTAL SCORE: 1
6. BECOMING EASILY ANNOYED OR IRRITABLE: NOT AT ALL
IF YOU CHECKED OFF ANY PROBLEMS ON THIS QUESTIONNAIRE, HOW DIFFICULT HAVE THESE PROBLEMS MADE IT FOR YOU TO DO YOUR WORK, TAKE CARE OF THINGS AT HOME, OR GET ALONG WITH OTHER PEOPLE: NOT DIFFICULT AT ALL

## 2020-11-18 ASSESSMENT — PATIENT HEALTH QUESTIONNAIRE - PHQ9
SUM OF ALL RESPONSES TO PHQ QUESTIONS 1-9: 0
5. POOR APPETITE OR OVEREATING: NOT AT ALL

## 2020-11-18 ASSESSMENT — MIFFLIN-ST. JEOR: SCORE: 1303.8

## 2020-11-18 NOTE — PATIENT INSTRUCTIONS
Follow up with your primary care provider for your other medical problems.  Continue self breast exam.  Increase physical activity and exercise.  Usual safety and preventative measures counseling done.  Flu Shot today.  Last pap smear (2019) was normal.  No pap was obtained this year.  This was discussed with the patient and she agrees with the plan.

## 2020-11-18 NOTE — PROGRESS NOTES
Rani is a 25 year old  female who presents for annual exam.     Besides routine health maintenance, she has no other health concerns today .    HPI:  Here today for yearly exam --doing well.  Using continuous OCPs and having menses every 3 months.  Usually 6-7d menses --not terribly heavy or cramping.  No breakthrough bleeding/spotting.  +SA --no issues.  Denies pain or dryness.  No bowel/bladder issues    Dating --lives with boyfriend of 6yrs in [place]; works as  for [employer]; working from home since March  -staying active with walking; has 2yo 90# berniedoodle and 4mo yellow lab!!    -weight stable this year  +SBE --no issues; left breast fibroadenoma stable  No PCP --no other medical issues  -agrees to flu shot today      GYNECOLOGIC HISTORY:    Patient's last menstrual period was 10/21/2020 (approximate).    Regular menses? No on continuous OCP    Her current contraception method is: oral contraceptives.  She  reports that she has never smoked. She has never used smokeless tobacco.    Patient is sexually active.  STD testing offered?  Declined  Last PHQ-9 score on record =   PHQ-9 SCORE 2020   PHQ-9 Total Score 0     Last GAD7 score on record =   NICOLE-7 SCORE 2020   Total Score 1     Alcohol Score = 4    HEALTH MAINTENANCE:  Cholesterol: (No results found for: CHOL   Last Mammo: Not applicable, Result: Not applicable, Next Mammo: Due at age 40   Pap: 19 WNL  Lab Results   Component Value Date    PAP NIL 2019    PAP NIL 11/10/2016   Colonoscopy:  NA, Result: Not applicable, Next Colonoscopy: NA years.  Dexa:  NA    Health maintenance updated:  yes    HISTORY:  OB History    Para Term  AB Living   0 0 0 0 0 0   SAB TAB Ectopic Multiple Live Births   0 0 0 0 0       Patient Active Problem List   Diagnosis     Uses oral contraception     History of eating disorder     Underweight     Breast cyst, left     Past Surgical History:   Procedure Laterality  "Date     BREAST BIOPSY, CORE RT/LT Left 03/2018    fibroadenoma      Social History     Tobacco Use     Smoking status: Never Smoker     Smokeless tobacco: Never Used   Substance Use Topics     Alcohol use: Yes     Alcohol/week: 0.0 standard drinks      Problem (# of Occurrences) Relation (Name,Age of Onset)    Hypertension (1) Other    No Known Problems (7) Mother, Father, Sister, Brother, Maternal Grandmother, Maternal Grandfather, Paternal Grandmother            Current Outpatient Medications   Medication Sig     desogestrel-ethinyl estradiol (APRI) 0.15-30 MG-MCG tablet TAKE 1 TABLET BY MOUTH ONCE DAILY IN A CONTINUOUS FASHION     No current facility-administered medications for this visit.      No Known Allergies    Past medical, surgical, social and family histories were reviewed and updated in EPIC.    ROS:   12 point review of systems negative other than symptoms noted below or in the HPI.  No urinary frequency or dysuria, bladder or kidney problems, Normal menstrual cycles    EXAM:  /70   Pulse 64   Ht 1.702 m (5' 7\")   Wt 52.6 kg (116 lb)   LMP 10/21/2020 (Approximate)   Breastfeeding No   BMI 18.17 kg/m     BMI: Body mass index is 18.17 kg/m .    PHYSICAL EXAM:  Constitutional:   Appearance: Well nourished, well developed, alert, in no acute distress  Neck:  Lymph Nodes:  No lymphadenopathy present    Thyroid:  Gland size normal, nontender, no nodules or masses present  on palpation  Chest:  Respiratory Effort:  Breathing unlabored  Cardiovascular:    Heart: Auscultation:  Regular rate, normal rhythm, no murmurs present  Breasts: Inspection of Breasts:  No lymphadenopathy present., Palpation of Breasts and Axillae:  No masses present on palpation, no breast tenderness., Axillary Lymph Nodes:  No lymphadenopathy present., No nodularity, asymmetry or nipple discharge bilaterally. and STABLE 2CM MOBILE WELL CIRCUMSCRIBED CYST NOTED AT 5-6 OCLOCK IN LEFT BREAST  Gastrointestinal:   Abdominal " Examination:  Abdomen nontender to palpation, tone normal without rigidity or guarding, no masses present, umbilicus without lesions   Liver and Spleen:  No hepatomegaly present, liver nontender to palpation    Hernias:  No hernias present  Lymphatic: Lymph Nodes:  No other lymphadenopathy present  Skin:  General Inspection:  No rashes present, no lesions present, no areas of  discoloration  Neurologic:    Mental Status:  Oriented X3.  Normal strength and tone, sensory exam                grossly normal, mentation intact and speech normal.    Psychiatric:   Mentation appears normal and affect normal/bright.         Pelvic Exam:  External Genitalia:     Normal appearance for age, no discharge present, no tenderness present, no inflammatory lesions present, color normal  Vagina:     Normal vaginal vault without central or paravaginal defects, no discharge present, no inflammatory lesions present, no masses present  Bladder:     Nontender to palpation  Urethra:   Urethral Body:  Urethra palpation normal, urethra structural support normal   Urethral Meatus:  No erythema or lesions present  Cervix:     Appearance healthy, no lesions present, nontender to palpation, no bleeding present  Uterus:     Uterus: firm, normal sized and nontender, midplane in position.   Adnexa:     No adnexal tenderness present, no adnexal masses present  Perineum:     Perineum within normal limits, no evidence of trauma, no rashes or skin lesions present  Anus:     Anus within normal limits, no hemorrhoids present  Inguinal Lymph Nodes:     No lymphadenopathy present  Pubic Hair:     Normal pubic hair distribution for age  Genitalia and Groin:     No rashes present, no lesions present, no areas of discoloration, no masses present      COUNSELING:   Reviewed preventive health counseling, as reflected in patient instructions  Special attention given to:        Regular exercise       Healthy diet/nutrition       Contraception    BMI: Body mass  index is 18.17 kg/m .      ASSESSMENT:  25 year old female with satisfactory annual exam.    ICD-10-CM    1. Encounter for gynecological examination without abnormal finding  Z01.419    2. Dysmenorrhea  N94.6 desogestrel-ethinyl estradiol (APRI) 0.15-30 MG-MCG tablet   3. Need for prophylactic vaccination and inoculation against influenza  Z23 INFLUENZA VACCINE IM > 6 MONTHS VALENT IIV4 [57542]     ADMIN 1st VACCINE   4. Uses oral contraception  Z30.41        PLAN:  Patient Instructions   Follow up with your primary care provider for your other medical problems.  Continue self breast exam.  Increase physical activity and exercise.  Usual safety and preventative measures counseling done.  Flu Shot today.  Last pap smear (2019) was normal.  No pap was obtained this year.  This was discussed with the patient and she agrees with the plan.      Valarie Rice MD

## 2020-11-19 ASSESSMENT — ANXIETY QUESTIONNAIRES: GAD7 TOTAL SCORE: 1

## 2021-10-24 ENCOUNTER — HEALTH MAINTENANCE LETTER (OUTPATIENT)
Age: 26
End: 2021-10-24

## 2021-12-06 DIAGNOSIS — N94.6 DYSMENORRHEA: ICD-10-CM

## 2021-12-07 RX ORDER — DESOGESTREL AND ETHINYL ESTRADIOL 0.15-0.03
KIT ORAL
Qty: 28 TABLET | Refills: 0 | Status: SHIPPED | OUTPATIENT
Start: 2021-12-07 | End: 2022-01-17

## 2021-12-07 NOTE — TELEPHONE ENCOUNTER
"Requested Prescriptions   Pending Prescriptions Disp Refills     desogestrel-ethinyl estradiol (APRI) 0.15-30 MG-MCG tablet [Pharmacy Med Name: APRI 28 DAY TABLET] 112 tablet 4     Sig: TAKE 1 TABLET BY MOUTH ONCE DAILY IN A CONTINUOUS FASHION       Contraceptives Protocol Failed - 12/6/2021  9:23 PM        Failed - Recent (12 mo) or future (30 days) visit within the authorizing provider's specialty     Patient has had an office visit with the authorizing provider or a provider within the authorizing providers department within the previous 12 mos or has a future within next 30 days. See \"Patient Info\" tab in inbasket, or \"Choose Columns\" in Meds & Orders section of the refill encounter.              Passed - Patient is not a current smoker if age is 35 or older        Passed - Medication is active on med list        Passed - No active pregnancy on record        Passed - No positive pregnancy test in past 12 months           Last Written Prescription Date:  11/18/20  Last Fill Quantity: 112,  # refills: 4   Last office visit: 11/18/2020 with prescribing provider:  Dwayne   Future Office Visit:  NONE    Medication is being filled for 1 time refill only due to:  Patient needs to be seen because it has been more than one year since last visit.  Tara Valdez RN on 12/7/2021 at 5:56 AM        "

## 2021-12-19 ENCOUNTER — HEALTH MAINTENANCE LETTER (OUTPATIENT)
Age: 26
End: 2021-12-19

## 2022-01-15 DIAGNOSIS — N94.6 DYSMENORRHEA: ICD-10-CM

## 2022-01-17 RX ORDER — DESOGESTREL AND ETHINYL ESTRADIOL 0.15-0.03
KIT ORAL
Qty: 28 TABLET | Refills: 0 | OUTPATIENT
Start: 2022-01-17

## 2022-01-17 RX ORDER — DESOGESTREL AND ETHINYL ESTRADIOL 0.15-0.03
KIT ORAL
Qty: 84 TABLET | Refills: 0 | Status: SHIPPED | OUTPATIENT
Start: 2022-01-17 | End: 2022-04-07

## 2022-01-17 NOTE — TELEPHONE ENCOUNTER
Prescription approved per [facility] Refill Protocol.  Tali Lamb RN on 1/17/2022 at 1:25 PM

## 2022-01-17 NOTE — TELEPHONE ENCOUNTER
Patient just scheduled her Annual visit with  on 2/25. Please call her BC into a new Pharmacy- [facility] -at [address] [place] [address] in [place].

## 2022-01-17 NOTE — TELEPHONE ENCOUNTER
"Requested Prescriptions   Pending Prescriptions Disp Refills     desogestrel-ethinyl estradiol (APRI) 0.15-30 MG-MCG tablet [Pharmacy Med Name: APRI 28 DAY TABLET] 28 tablet 0     Sig: TAKE 1 TABLET ONCE DAILY IN A CONTINUOUS FASHION. APPOINTMENT NEEDED FOR ADDITIONAL REFILLS.       Contraceptives Protocol Failed - 1/15/2022  9:52 AM        Failed - Recent (12 mo) or future (30 days) visit within the authorizing provider's specialty     Patient has had an office visit with the authorizing provider or a provider within the authorizing providers department within the previous 12 mos or has a future within next 30 days. See \"Patient Info\" tab in inbasket, or \"Choose Columns\" in Meds & Orders section of the refill encounter.              Passed - Patient is not a current smoker if age is 35 or older        Passed - Medication is active on med list        Passed - No active pregnancy on record        Passed - No positive pregnancy test in past 12 months           Pt due for annual, no appt scheduled. Pt already received one month extension. Rx denied.   Valorie Mcmahan RN on 1/17/2022 at 9:16 AM    "

## 2022-02-01 NOTE — TELEPHONE ENCOUNTER
Patient is calling today to ask us to send her BC to the [facility]'s in [place] not in [place]. They never received  it

## 2022-04-07 DIAGNOSIS — N94.6 DYSMENORRHEA: ICD-10-CM

## 2022-04-07 RX ORDER — DESOGESTREL AND ETHINYL ESTRADIOL 0.15-0.03
KIT ORAL
Qty: 28 TABLET | Refills: 0 | Status: SHIPPED | OUTPATIENT
Start: 2022-04-07 | End: 2022-04-26

## 2022-04-07 NOTE — TELEPHONE ENCOUNTER
"Requested Prescriptions   Pending Prescriptions Disp Refills     desogestrel-ethinyl estradiol (APRI) 0.15-30 MG-MCG tablet [Pharmacy Med Name: APRI TABS 28S] 84 tablet 0     Sig: TAKE 1 TABLET BY MOUTH ONCE DAILY IN A CONTINUOUS FASHION       Contraceptives Protocol Passed - 4/7/2022  3:20 PM        Passed - Patient is not a current smoker if age is 35 or older        Passed - Recent (12 mo) or future (30 days) visit within the authorizing provider's specialty     Patient has had an office visit with the authorizing provider or a provider within the authorizing providers department within the previous 12 mos or has a future within next 30 days. See \"Patient Info\" tab in inbasket, or \"Choose Columns\" in Meds & Orders section of the refill encounter.              Passed - Medication is active on med list        Passed - No active pregnancy on record        Passed - No positive pregnancy test in past 12 months           One month sent  appt cancelled per pt on 2/25/22  Next 5 appointments (look out 90 days)    Apr 29, 2022  2:30 PM  PHYSICAL with Valarie Rice MD  [facility] [place] ([facility] ) [address] [address] [phone]        No additional refills if not seen at 4/29 appt  Valorie Mcmahan RN on 4/7/2022 at 3:25 PM    "

## 2022-04-26 DIAGNOSIS — N94.6 DYSMENORRHEA: ICD-10-CM

## 2022-04-26 RX ORDER — DESOGESTREL AND ETHINYL ESTRADIOL 0.15-0.03
KIT ORAL
Qty: 112 TABLET | Refills: 0 | Status: SHIPPED | OUTPATIENT
Start: 2022-04-26 | End: 2022-04-29

## 2022-04-26 NOTE — TELEPHONE ENCOUNTER
"Requested Prescriptions   Pending Prescriptions Disp Refills     desogestrel-ethinyl estradiol (APRI) 0.15-30 MG-MCG tablet [Pharmacy Med Name: APRI TABS 28S] 28 tablet 0     Sig: TAKE 1 TABLET BY MOUTH EVERY DAY IN A CONTINUOUS FASHION       Contraceptives Protocol Passed - 4/26/2022  8:58 AM        Passed - Patient is not a current smoker if age is 35 or older        Passed - Recent (12 mo) or future (30 days) visit within the authorizing provider's specialty     Patient has had an office visit with the authorizing provider or a provider within the authorizing providers department within the previous 12 mos or has a future within next 30 days. See \"Patient Info\" tab in inbasket, or \"Choose Columns\" in Meds & Orders section of the refill encounter.              Passed - Medication is active on med list        Passed - No active pregnancy on record        Passed - No positive pregnancy test in past 12 months           Last Written Prescription Date:  04/07/2022  Last Fill Quantity: 28,  # refills: 0   Last office visit: 11/18/2020 with prescribing provider:  Dr. Rice   Future Office Visit:   Next 5 appointments (look out 90 days)    Apr 29, 2022  2:30 PM  PHYSICAL with Valarie Rice MD  [facility] [place] ([facility] ) [address] [place] [id number] [phone]         Prescription approved per [facility] Refill Protocol.  Valorie Mcmahan RN on 4/26/2022 at 9:31 AM        "

## 2022-04-29 ENCOUNTER — OFFICE VISIT (OUTPATIENT)
Dept: OBGYN | Facility: CLINIC | Age: 27
End: 2022-04-29
Payer: COMMERCIAL

## 2022-04-29 VITALS
BODY MASS INDEX: 18.64 KG/M2 | HEIGHT: 68 IN | WEIGHT: 123 LBS | DIASTOLIC BLOOD PRESSURE: 82 MMHG | SYSTOLIC BLOOD PRESSURE: 128 MMHG

## 2022-04-29 DIAGNOSIS — Z30.41 USES ORAL CONTRACEPTION: ICD-10-CM

## 2022-04-29 DIAGNOSIS — Z01.419 ENCOUNTER FOR GYNECOLOGICAL EXAMINATION WITHOUT ABNORMAL FINDING: Primary | ICD-10-CM

## 2022-04-29 DIAGNOSIS — N94.6 DYSMENORRHEA: ICD-10-CM

## 2022-04-29 PROCEDURE — 99395 PREV VISIT EST AGE 18-39: CPT | Performed by: OBSTETRICS & GYNECOLOGY

## 2022-04-29 PROCEDURE — G0145 SCR C/V CYTO,THINLAYER,RESCR: HCPCS | Performed by: OBSTETRICS & GYNECOLOGY

## 2022-04-29 RX ORDER — DESOGESTREL AND ETHINYL ESTRADIOL 0.15-0.03
KIT ORAL
Qty: 112 TABLET | Refills: 4 | Status: SHIPPED | OUTPATIENT
Start: 2022-04-29 | End: 2023-05-04

## 2022-04-29 ASSESSMENT — PATIENT HEALTH QUESTIONNAIRE - PHQ9
5. POOR APPETITE OR OVEREATING: NOT AT ALL
SUM OF ALL RESPONSES TO PHQ QUESTIONS 1-9: 0

## 2022-04-29 ASSESSMENT — ANXIETY QUESTIONNAIRES
3. WORRYING TOO MUCH ABOUT DIFFERENT THINGS: SEVERAL DAYS
GAD7 TOTAL SCORE: 1
IF YOU CHECKED OFF ANY PROBLEMS ON THIS QUESTIONNAIRE, HOW DIFFICULT HAVE THESE PROBLEMS MADE IT FOR YOU TO DO YOUR WORK, TAKE CARE OF THINGS AT HOME, OR GET ALONG WITH OTHER PEOPLE: NOT DIFFICULT AT ALL
2. NOT BEING ABLE TO STOP OR CONTROL WORRYING: NOT AT ALL
6. BECOMING EASILY ANNOYED OR IRRITABLE: NOT AT ALL
1. FEELING NERVOUS, ANXIOUS, OR ON EDGE: NOT AT ALL
7. FEELING AFRAID AS IF SOMETHING AWFUL MIGHT HAPPEN: NOT AT ALL
5. BEING SO RESTLESS THAT IT IS HARD TO SIT STILL: NOT AT ALL

## 2022-04-29 NOTE — PROGRESS NOTES
Rani is a 26 year old  female who presents for annual exam.     Besides routine health maintenance, she has no other health concerns today .    HPI:  Here today for yearly exam --doing well.  Regular, monthly menses x 7d with current OCP.  Light bleeding and minimal cramping.  No intermenstrual bleeding/spotting.  +SA --no issues.  Denies bowel/bladder issues    Dating --lives with partner of 6yrs; bought new home together in [place] this year --loving it; 2 dogs; working hybrid as  for [employer] --only in office 1-2x/month  -staying active; moved from apartment with exercise equipment to new home so bought rowing machine --using consistently  +SBE --no issues; stable left breast cyst  No PCP --no other medical issues          GYNECOLOGIC HISTORY:    Patient's last menstrual period was 2022 (approximate).    Regular menses? yes  Menses every 30 days.  Length of menses: 7 days    Her current contraception method is: oral contraceptives.  She  reports that she has never smoked. She has never used smokeless tobacco.    Patient is sexually active.  STD testing offered?  Declined  Last PHQ-9 score on record =   PHQ-9 SCORE 2020   PHQ-9 Total Score 0     Last GAD7 score on record =   NICOLE-7 SCORE 2020   Total Score 1     Alcohol Score = 3    HEALTH MAINTENANCE:  Cholesterol: (No results found for: CHOL   Last Mammo: Not applicable, Result: Not applicable, Next Mammo: Due at age 40   Pap:   Lab Results   Component Value Date    PAP NIL 2019    PAP NIL 11/10/2016   6/7/19 WNL   Colonoscopy:  NA, Result: Not applicable, Next Colonoscopy: NA years.  Dexa:  NA    Health maintenance updated:  yes    HISTORY:  OB History    Para Term  AB Living   0 0 0 0 0 0   SAB IAB Ectopic Multiple Live Births   0 0 0 0 0       Patient Active Problem List   Diagnosis     Uses oral contraception     History of eating disorder     Underweight     Breast cyst, left     Past  "Surgical History:   Procedure Laterality Date     BREAST BIOPSY, CORE RT/LT Left 03/2018    fibroadenoma      Social History     Tobacco Use     Smoking status: Never Smoker     Smokeless tobacco: Never Used   Substance Use Topics     Alcohol use: Yes     Alcohol/week: 0.0 standard drinks      Problem (# of Occurrences) Relation (Name,Age of Onset)    Hypertension (1) Other    No Known Problems (8) Mother, Father, Sister, Brother, Maternal Grandmother, Maternal Grandfather, Paternal Grandmother, Paternal Grandfather            Current Outpatient Medications   Medication Sig     desogestrel-ethinyl estradiol (APRI) 0.15-30 MG-MCG tablet TAKE 1 TABLET BY MOUTH EVERY DAY IN A CONTINUOUS FASHION     No current facility-administered medications for this visit.     No Known Allergies    Past medical, surgical, social and family histories were reviewed and updated in EPIC.    ROS:   12 point review of systems negative other than symptoms noted below or in the HPI.  No urinary frequency or dysuria, bladder or kidney problems, Normal menstrual cycles    EXAM:  Ht 1.715 m (5' 7.5\")   Wt 55.8 kg (123 lb)   LMP 04/08/2022 (Approximate)   Breastfeeding No   BMI 18.98 kg/m     BMI: Body mass index is 18.98 kg/m .    PHYSICAL EXAM:  Constitutional:   Appearance: Well nourished, well developed, alert, in no acute distress  Neck:  Lymph Nodes:  No lymphadenopathy present    Thyroid:  Gland size normal, nontender, no nodules or masses present  on palpation  Chest:  Respiratory Effort:  Breathing unlabored  Cardiovascular:    Heart: Auscultation:  Regular rate, normal rhythm, no murmurs present  Breasts: Palpation of Breasts and Axillae:  No masses present on palpation, no breast tenderness., Axillary Lymph Nodes:  No lymphadenopathy present., No nodularity, asymmetry or nipple discharge bilaterally. and GRAPE SIZE, OVOID MOBILE LUMP IN LEFT BREAST AT 5-6O'CLOCK  Gastrointestinal:   Abdominal Examination:  Abdomen nontender to " palpation, tone normal without rigidity or guarding, no masses present, umbilicus without lesions   Liver and Spleen:  No hepatomegaly present, liver nontender to palpation    Hernias:  No hernias present  Lymphatic: Lymph Nodes:  No other lymphadenopathy present  Skin:  General Inspection:  No rashes present, no lesions present, no areas of  discoloration  Neurologic:    Mental Status:  Oriented X3.  Normal strength and tone, sensory exam                grossly normal, mentation intact and speech normal.    Psychiatric:   Mentation appears normal and affect normal/bright.         Pelvic Exam:  External Genitalia:     Normal appearance for age, no discharge present, no tenderness present, no inflammatory lesions present, color normal  Vagina:     Normal vaginal vault without central or paravaginal defects, no discharge present, no inflammatory lesions present, no masses present  Bladder:     Nontender to palpation  Urethra:   Urethral Body:  Urethra palpation normal, urethra structural support normal   Urethral Meatus:  No erythema or lesions present  Cervix:     Appearance healthy, no lesions present, nontender to palpation, no bleeding present  Uterus:     Uterus: firm, normal sized and nontender, midplane in position.   Adnexa:     No adnexal tenderness present, no adnexal masses present  Perineum:     Perineum within normal limits, no evidence of trauma, no rashes or skin lesions present  Anus:     Anus within normal limits, no hemorrhoids present  Inguinal Lymph Nodes:     No lymphadenopathy present  Pubic Hair:     Normal pubic hair distribution for age  Genitalia and Groin:     No rashes present, no lesions present, no areas of discoloration, no masses present      COUNSELING:   Reviewed preventive health counseling, as reflected in patient instructions  Special attention given to:        Regular exercise       Healthy diet/nutrition       Contraception    BMI: Body mass index is 18.98  kg/m .      ASSESSMENT:  26 year old female with satisfactory annual exam.    ICD-10-CM    1. Encounter for gynecological examination without abnormal finding  Z01.419    2. Dysmenorrhea  N94.6        PLAN:  Patient Instructions   Follow up with your primary care provider for your other medical problems.  Continue self breast exam.  Increase physical activity and exercise.  Lab and pap smear results will be called to the patient.  Reflex pap smear done today and will repeat in 3 years if normal.  Usual safety and preventative measures counseling done.       Valarie Rice MD

## 2022-04-29 NOTE — PATIENT INSTRUCTIONS
Follow up with your primary care provider for your other medical problems.  Continue self breast exam.  Increase physical activity and exercise.  Lab and pap smear results will be called to the patient.  Reflex pap smear done today and will repeat in 3 years if normal.  Usual safety and preventative measures counseling done.

## 2022-04-30 ASSESSMENT — ANXIETY QUESTIONNAIRES: GAD7 TOTAL SCORE: 1

## 2022-05-04 LAB
BKR LAB AP GYN ADEQUACY: NORMAL
BKR LAB AP GYN INTERPRETATION: NORMAL
BKR LAB AP HPV REFLEX: NORMAL
BKR LAB AP LMP: NORMAL
BKR LAB AP PREVIOUS ABNORMAL: NORMAL
PATH REPORT.COMMENTS IMP SPEC: NORMAL
PATH REPORT.COMMENTS IMP SPEC: NORMAL
PATH REPORT.RELEVANT HX SPEC: NORMAL

## 2022-10-10 ENCOUNTER — HEALTH MAINTENANCE LETTER (OUTPATIENT)
Age: 27
End: 2022-10-10

## 2023-05-04 DIAGNOSIS — N94.6 DYSMENORRHEA: ICD-10-CM

## 2023-05-04 RX ORDER — DESOGESTREL AND ETHINYL ESTRADIOL 0.15-0.03
KIT ORAL
Qty: 28 TABLET | Refills: 0 | Status: SHIPPED | OUTPATIENT
Start: 2023-05-04 | End: 2023-07-12

## 2023-05-04 NOTE — TELEPHONE ENCOUNTER
"Requested Prescriptions   Pending Prescriptions Disp Refills     desogestrel-ethinyl estradiol (APRI) 0.15-30 MG-MCG tablet [Pharmacy Med Name: APRI 28 DAY TABLET] 28 tablet 19     Sig: TAKE 1 TABLET BY MOUTH EVERY DAY IN A CONTINUOUS FASHION       Contraceptives Protocol Failed - 5/4/2023  3:36 AM        Failed - Recent (12 mo) or future (30 days) visit within the authorizing provider's specialty     Patient has had an office visit with the authorizing provider or a provider within the authorizing providers department within the previous 12 mos or has a future within next 30 days. See \"Patient Info\" tab in inbasket, or \"Choose Columns\" in Meds & Orders section of the refill encounter.              Passed - Patient is not a current smoker if age is 35 or older        Passed - Medication is active on med list        Passed - No active pregnancy on record        Passed - No positive pregnancy test in past 12 months           Last Written Prescription Date:  4/29/22  Last Fill Quantity: 112,  # refills: 4   Last office visit: 4/29/2022 ; last virtual visit: Visit date not found with prescribing provider:  Dwayne   Future Office Visit:  NONE    Medication is being filled for 1 time refill only due to:  Patient needs to be seen because it has been more than one year since last visit.  [employer] message sent as reminder to schedule annual.  Tara Valdez RN on 5/4/2023 at 6:18 AM            "

## 2023-05-27 DIAGNOSIS — N94.6 DYSMENORRHEA: ICD-10-CM

## 2023-05-30 RX ORDER — DESOGESTREL AND ETHINYL ESTRADIOL 0.15-0.03
KIT ORAL
Qty: 28 TABLET | Refills: 0 | OUTPATIENT
Start: 2023-05-30

## 2023-05-30 NOTE — TELEPHONE ENCOUNTER
"Requested Prescriptions   Pending Prescriptions Disp Refills     desogestrel-ethinyl estradiol (APRI) 0.15-30 MG-MCG tablet [Pharmacy Med Name: APRI 28 DAY TABLET] 28 tablet 0     Sig: TAKE 1 TABLET BY MOUTH EVERY DAY IN A CONTINUOUS FASHION. DUE FOR APPT FOR REFILLS       Contraceptives Protocol Failed - 5/27/2023  7:45 AM        Failed - Recent (12 mo) or future (30 days) visit within the authorizing provider's specialty     Patient has had an office visit with the authorizing provider or a provider within the authorizing providers department within the previous 12 mos or has a future within next 30 days. See \"Patient Info\" tab in inbasket, or \"Choose Columns\" in Meds & Orders section of the refill encounter.              Passed - Patient is not a current smoker if age is 35 or older        Passed - Medication is active on med list        Passed - No active pregnancy on record        Passed - No positive pregnancy test in past 12 months           Last Written Prescription Date:  5/4/23  Last Fill Quantity: 28,  # refills: 0   Last office visit: 4/29/2022 ; last virtual visit: Visit date not found with prescribing provider:  Dwayne   Future Office Visit:  NONE    Pt due for annual, no appt scheduled. Pt already received one month extension. Rx denied.   Tara Valdez RN on 5/30/2023 at 6:08 AM            "

## 2023-06-11 ENCOUNTER — HEALTH MAINTENANCE LETTER (OUTPATIENT)
Age: 28
End: 2023-06-11

## 2023-07-11 DIAGNOSIS — N94.6 DYSMENORRHEA: ICD-10-CM

## 2023-07-11 RX ORDER — DESOGESTREL AND ETHINYL ESTRADIOL 0.15-0.03
KIT ORAL
Qty: 28 TABLET | Refills: 0 | OUTPATIENT
Start: 2023-07-11

## 2023-07-11 NOTE — TELEPHONE ENCOUNTER
"Requested Prescriptions   Pending Prescriptions Disp Refills     desogestrel-ethinyl estradiol (APRI) 0.15-30 MG-MCG tablet [Pharmacy Med Name: APRI 28 DAY TABLET] 28 tablet 0     Sig: TAKE 1 TABLET BY MOUTH EVERY DAY IN A CONTINUOUS FASHION. DUE FOR APPT FOR REFILLS       Contraceptives Protocol Failed - 7/11/2023  4:48 PM        Failed - Recent (12 mo) or future (30 days) visit within the authorizing provider's specialty     Patient has had an office visit with the authorizing provider or a provider within the authorizing providers department within the previous 12 mos or has a future within next 30 days. See \"Patient Info\" tab in inbasket, or \"Choose Columns\" in Meds & Orders section of the refill encounter.              Passed - Patient is not a current smoker if age is 35 or older        Passed - Medication is active on med list        Passed - No active pregnancy on record        Passed - No positive pregnancy test in past 12 months           Pt due for annual, no appt scheduled. Pt already received one month extension. Rx denied.   Valorie Mcmahan RN on 7/11/2023 at 4:51 PM    "

## 2023-07-12 ENCOUNTER — TELEPHONE (OUTPATIENT)
Dept: OBGYN | Facility: CLINIC | Age: 28
End: 2023-07-12
Payer: COMMERCIAL

## 2023-07-12 DIAGNOSIS — N94.6 DYSMENORRHEA: ICD-10-CM

## 2023-07-12 RX ORDER — DESOGESTREL AND ETHINYL ESTRADIOL 0.15-0.03
KIT ORAL
Qty: 112 TABLET | Refills: 0 | Status: SHIPPED | OUTPATIENT
Start: 2023-07-12 | End: 2023-10-11

## 2023-10-11 DIAGNOSIS — N94.6 DYSMENORRHEA: ICD-10-CM

## 2023-10-11 RX ORDER — DESOGESTREL AND ETHINYL ESTRADIOL 0.15-0.03
KIT ORAL
Qty: 28 TABLET | Refills: 0 | Status: SHIPPED | OUTPATIENT
Start: 2023-10-11 | End: 2023-11-10

## 2023-10-11 NOTE — TELEPHONE ENCOUNTER
"Requested Prescriptions   Pending Prescriptions Disp Refills    desogestrel-ethinyl estradiol (APRI) 0.15-30 MG-MCG tablet [Pharmacy Med Name: APRI 28 DAY TABLET] 28 tablet 3     Sig: TAKE 1 TABLET BY MOUTH EVERY DAY IN A CONTINUOUS FASHION       Contraceptives Protocol Failed - 10/11/2023 12:57 AM        Failed - Recent (12 mo) or future (30 days) visit within the authorizing provider's specialty     Patient has had an office visit with the authorizing provider or a provider within the authorizing providers department within the previous 12 mos or has a future within next 30 days. See \"Patient Info\" tab in inbasket, or \"Choose Columns\" in Meds & Orders section of the refill encounter.              Passed - Patient is not a current smoker if age is 35 or older        Passed - Medication is active on med list        Passed - No active pregnancy on record        Passed - No positive pregnancy test in past 12 months           Last Written Prescription Date:  7/12/23  Last Fill Quantity: 112,  # refills: 0   Last office visit: 4/29/2022 ; last virtual visit: Visit date not found with prescribing provider:  Dwayne   Future Office Visit:   Next 5 appointments (look out 90 days)      Nov 07, 2023  1:30 PM  [facility] [address] [place] [address] [phone]           11/14/23 appointment with Dr. Rice.    Medication is being filled for 1 time refill only due to:  Patient needs to be seen because it has been more than one year since last visit.  Appointment scheduled.  Tara Valdez RN on 10/11/2023 at 5:56 AM          "

## 2023-11-10 DIAGNOSIS — N94.6 DYSMENORRHEA: ICD-10-CM

## 2023-11-10 RX ORDER — DESOGESTREL AND ETHINYL ESTRADIOL 0.15-0.03
KIT ORAL
Qty: 28 TABLET | Refills: 0 | Status: SHIPPED | OUTPATIENT
Start: 2023-11-10 | End: 2023-12-07

## 2023-11-10 NOTE — TELEPHONE ENCOUNTER
"Requested Prescriptions   Pending Prescriptions Disp Refills    desogestrel-ethinyl estradiol (APRI) 0.15-30 MG-MCG tablet [Pharmacy Med Name: APRI 28 DAY TABLET] 28 tablet 0     Sig: TAKE 1 TABLET BY MOUTH EVERY DAY IN A CONTINUOUS FASHION       Contraceptives Protocol Failed - 11/10/2023 12:56 AM        Failed - Recent (12 mo) or future (30 days) visit within the authorizing provider's specialty     Patient has had an office visit with the authorizing provider or a provider within the authorizing providers department within the previous 12 mos or has a future within next 30 days. See \"Patient Info\" tab in inbasket, or \"Choose Columns\" in Meds & Orders section of the refill encounter.              Passed - Patient is not a current smoker if age is 35 or older        Passed - Medication is active on med list        Passed - No active pregnancy on record        Passed - No positive pregnancy test in past 12 months           Appt scheduled   Prescription approved per [facility] Refill Protocol.  Valorie Mcmahan RN on 11/10/2023 at 3:56 PM    "

## 2023-11-15 ENCOUNTER — OFFICE VISIT (OUTPATIENT)
Dept: FAMILY MEDICINE | Facility: CLINIC | Age: 28
End: 2023-11-15

## 2023-11-15 VITALS
DIASTOLIC BLOOD PRESSURE: 77 MMHG | HEIGHT: 67 IN | HEART RATE: 73 BPM | WEIGHT: 120.8 LBS | OXYGEN SATURATION: 100 % | BODY MASS INDEX: 18.96 KG/M2 | SYSTOLIC BLOOD PRESSURE: 117 MMHG

## 2023-11-15 DIAGNOSIS — F41.1 GAD (GENERALIZED ANXIETY DISORDER): Primary | ICD-10-CM

## 2023-11-15 DIAGNOSIS — Z30.41 ENCOUNTER FOR SURVEILLANCE OF CONTRACEPTIVE PILLS: ICD-10-CM

## 2023-11-15 DIAGNOSIS — Z86.59 HISTORY OF EATING DISORDER: ICD-10-CM

## 2023-11-15 PROCEDURE — 99204 OFFICE O/P NEW MOD 45 MIN: CPT | Performed by: FAMILY MEDICINE

## 2023-11-15 ASSESSMENT — PATIENT HEALTH QUESTIONNAIRE - PHQ9
SUM OF ALL RESPONSES TO PHQ QUESTIONS 1-9: 4
5. POOR APPETITE OR OVEREATING: MORE THAN HALF THE DAYS

## 2023-11-15 ASSESSMENT — ANXIETY QUESTIONNAIRES
3. WORRYING TOO MUCH ABOUT DIFFERENT THINGS: NEARLY EVERY DAY
6. BECOMING EASILY ANNOYED OR IRRITABLE: SEVERAL DAYS
GAD7 TOTAL SCORE: 16
2. NOT BEING ABLE TO STOP OR CONTROL WORRYING: NEARLY EVERY DAY
1. FEELING NERVOUS, ANXIOUS, OR ON EDGE: NEARLY EVERY DAY
7. FEELING AFRAID AS IF SOMETHING AWFUL MIGHT HAPPEN: NEARLY EVERY DAY
GAD7 TOTAL SCORE: 16
5. BEING SO RESTLESS THAT IT IS HARD TO SIT STILL: SEVERAL DAYS

## 2023-11-15 NOTE — PROGRESS NOTES
"SUBJECTIVE:    Rani Guan, is a 28 year old female presenting for the below:     1. H/o anorexia :outpatient and inpatient management with [facility]. Feeling in a good place now.     2. Contraception : combined OCP for many years. Contemplative about stopping.     3. NICOLE : following with therapist. Good results. Interested in ssri to augment effects of therapy.       OBJECTIVE:  Vitals:    11/15/23 1510   BP: 117/77   Pulse: 73   SpO2: 100%   Weight: 54.8 kg (120 lb 12.8 oz)   Height: 1.702 m (5' 7\")    Body mass index is 18.92 kg/m .  General: no acute distress, cooperative with exam.  Psych: mental status normal, mood and affect appropriate.    PHQ 9 : 4  NICOLE 7 :16    ASSESSMENT / PLAN:      NICOLE (generalized anxiety disorder)  Discussed starting selective serotonin reuptake inhibitor for management. Mechanism of action, common side effects (GI, initial sleep disturbance, sexual dysfunction) and how to take discussed. Discussed 4-6 week lag time for full beneficial effects.   -follow up 6 weeks.   -     sertraline (ZOLOFT) 50 MG tablet; Take 1 tablet (50 mg) by mouth daily    History of eating disorder  Feels this is very stable now. No recent relapses.     Encounter for surveillance of contraceptive pills  Will discuss stopping COCP with firuben. May want to use condoms only for a period of time. Will likely start trying to conceive shortly after wedding sept 2024. Discussed Zoloft has best safety profile in pregnancy.       "

## 2023-12-07 DIAGNOSIS — N94.6 DYSMENORRHEA: ICD-10-CM

## 2023-12-07 RX ORDER — DESOGESTREL AND ETHINYL ESTRADIOL 0.15-0.03
KIT ORAL
Qty: 84 TABLET | Refills: 0 | Status: SHIPPED | OUTPATIENT
Start: 2023-12-07 | End: 2024-04-03

## 2023-12-07 NOTE — TELEPHONE ENCOUNTER
"Requested Prescriptions   Pending Prescriptions Disp Refills    desogestrel-ethinyl estradiol (APRI) 0.15-30 MG-MCG tablet [Pharmacy Med Name: APRI 28 DAY TABLET] 84 tablet 1     Sig: TAKE 1 TABLET BY MOUTH EVERY DAY IN A CONTINUOUS FASHION       Contraceptives Protocol Failed - 12/7/2023 12:33 PM        Failed - Recent (12 mo) or future (30 days) visit within the authorizing provider's specialty     Patient has had an office visit with the authorizing provider or a provider within the authorizing providers department within the previous 12 mos or has a future within next 30 days. See \"Patient Info\" tab in inbasket, or \"Choose Columns\" in Meds & Orders section of the refill encounter.              Passed - Patient is not a current smoker if age is 35 or older        Passed - Medication is active on med list        Passed - No active pregnancy on record        Passed - No positive pregnancy test in past 12 months           Last Written Prescription Date:  11/10/23  Last Fill Quantity: 28,  # refills: 0   Last office visit: 4/29/2022 ; last virtual visit: Visit date not found with prescribing provider:  Dr Rice   Future Office Visit:   Next 5 appointments (look out 90 days)      Dec 27, 2023  3:00 PM  SHORT with Teagan Gerardo MD  [facility] ([facility]) [address] [place] [address] [phone]          1/19/2024 apt w Dr Rice    Prescription approved per [facility] Refill Protocol.  Tali Lamb RN on 12/7/2023 at 12:46 PM            "

## 2024-01-30 DIAGNOSIS — F41.1 GAD (GENERALIZED ANXIETY DISORDER): ICD-10-CM

## 2024-01-30 NOTE — TELEPHONE ENCOUNTER
Med: Sertraline      LOV (related): 11/15/23      Due for F/U around: 6 weeks    Next Appt: None               11/18/2020     2:50 PM 4/29/2022     2:37 PM 11/15/2023     3:13 PM   PHQ   PHQ-9 Total Score 0 0 4   Q9: Thoughts of better off dead/self-harm past 2 weeks Not at all Not at all Not at all           11/18/2020     2:50 PM 4/29/2022     2:37 PM 11/15/2023     3:13 PM   NICOLE-7 SCORE   Total Score 1 1 16

## 2024-03-02 DIAGNOSIS — N94.6 DYSMENORRHEA: ICD-10-CM

## 2024-03-04 RX ORDER — DESOGESTREL AND ETHINYL ESTRADIOL 0.15-0.03
KIT ORAL
Qty: 84 TABLET | Refills: 0 | OUTPATIENT
Start: 2024-03-04

## 2024-03-04 NOTE — TELEPHONE ENCOUNTER
Requested Prescriptions   Pending Prescriptions Disp Refills    desogestrel-ethinyl estradiol (APRI) 0.15-30 MG-MCG tablet [Pharmacy Med Name: APRI 28 DAY TABLET] 84 tablet 0     Sig: TAKE 1 TABLET BY MOUTH EVERY DAY IN A CONTINUOUS FASHION       Contraceptives Protocol Failed - 3/2/2024  7:33 AM        Failed - Recent (12 mo) or future (30 days) visit within the authorizing provider's specialty     The patient must have completed an in-person or virtual visit within the past 12 months or has a future visit scheduled within the next 90 days with the authorizing provider s specialty.  Urgent care and e-visits do not quality as an office visit for this protocol.          Passed - Patient is not a current smoker if age is 35 or older        Passed - Medication is active on med list        Passed - No active pregnancy on record        Passed - No positive pregnancy test in past 12 months           Last Written Prescription Date:  12/7/23  Last Fill Quantity: 84,  # refills: 0   Last office visit: 4/29/2022 ; last virtual visit: Visit date not found with prescribing provider:  Dwayne     Future Office Visit:  NONE    Pt due for annual, no appt scheduled. Pt already received one month extension. Rx denied.   Tara Valdez RN on 3/4/2024 at 5:57 AM

## 2024-04-03 DIAGNOSIS — N94.6 DYSMENORRHEA: ICD-10-CM

## 2024-04-03 RX ORDER — DESOGESTREL AND ETHINYL ESTRADIOL 0.15-0.03
KIT ORAL
Qty: 84 TABLET | Refills: 0 | Status: SHIPPED | OUTPATIENT
Start: 2024-04-03 | End: 2024-07-11

## 2024-04-03 NOTE — TELEPHONE ENCOUNTER
Requested Prescriptions   Pending Prescriptions Disp Refills    desogestrel-ethinyl estradiol (APRI) 0.15-30 MG-MCG tablet [Pharmacy Med Name: APRI 28 DAY TABLET] 84 tablet 0     Sig: TAKE 1 TABLET BY MOUTH EVERY DAY IN A CONTINUOUS FASHION       Contraceptives Protocol Failed - 4/3/2024  2:18 PM        Failed - Recent (12 mo) or future (30 days) visit within the authorizing provider's specialty     The patient must have completed an in-person or virtual visit within the past 12 months or has a future visit scheduled within the next 90 days with the authorizing provider s specialty.  Urgent care and e-visits do not quality as an office visit for this protocol.          Passed - Patient is not a current smoker if age is 35 or older        Passed - Medication is active on med list        Passed - No active pregnancy on record        Passed - No positive pregnancy test in past 12 months           Last Written Prescription Date:  12/7/23  Last Fill Quantity: #84, 0 refills   Last office visit: 4/29/22    Future Appointments 4/3/2024 - 9/30/2024        Date Visit Type Length Department Provider     5/8/2024 10:40 AM RETURN ANNUAL 20 min [facility] Valarie Rice MD    Location Instructions:     The clinic is located at [address] [place] [address], [address] [place] [id number]                   Medication is being filled for 1 time refill only due to:  Patient needs to be seen because it has been more than one year since last visit. Future appt scheduled.       Jillian Hernandez RN on 4/3/2024 at 2:25 PM

## 2024-05-06 DIAGNOSIS — F41.1 GAD (GENERALIZED ANXIETY DISORDER): ICD-10-CM

## 2024-05-06 NOTE — CONFIDENTIAL NOTE
Med: SERTRALINE    LOV (related): 11/15/23      Due for F/U around: 1/2024 - OVERDUE FOR MH F/U    Next Appt: NONE            11/18/2020     2:50 PM 4/29/2022     2:37 PM 11/15/2023     3:13 PM   PHQ   PHQ-9 Total Score 0 0 4   Q9: Thoughts of better off dead/self-harm past 2 weeks Not at all Not at all Not at all           11/18/2020     2:50 PM 4/29/2022     2:37 PM 11/15/2023     3:13 PM   NICOLE-7 SCORE   Total Score 1 1 16

## 2024-06-02 DIAGNOSIS — N94.6 DYSMENORRHEA: ICD-10-CM

## 2024-06-03 RX ORDER — DESOGESTREL AND ETHINYL ESTRADIOL 0.15-0.03
KIT ORAL
Qty: 84 TABLET | Refills: 0 | OUTPATIENT
Start: 2024-06-03

## 2024-06-03 NOTE — TELEPHONE ENCOUNTER
Requested Prescriptions   Pending Prescriptions Disp Refills    desogestrel-ethinyl estradiol (APRI) 0.15-30 MG-MCG tablet [Pharmacy Med Name: APRI 28 DAY TABLET] 84 tablet 0     Sig: TAKE 1 TABLET BY MOUTH EVERY DAY IN A CONTINUOUS FASHION       Contraceptives Protocol Failed - 6/2/2024  8:04 AM        Failed - Recent (12 mo) or future (30 days) visit within the authorizing provider's specialty     The patient must have completed an in-person or virtual visit within the past 12 months or has a future visit scheduled within the next 90 days with the authorizing provider s specialty.  Urgent care and e-visits do not quality as an office visit for this protocol.          Passed - Patient is not a current smoker if age is 35 or older        Passed - Medication is active on med list        Passed - Medication indicated for associated diagnosis     Medication is associated with one or more of the following diagnoses:  Contraception  Acne  Dysmenorrhea  Menorrhagia  Amenorrhea  PCOS  Premenstrual Dysphoric Disorder          Passed - No active pregnancy on record        Passed - No positive pregnancy test in past 12 months           Last Written Prescription Date:  4/3/24  Last Fill Quantity: 84,  # refills: 0   Last office visit: Visit date 4/29/22; last virtual visit: Visit date not found with prescribing provider:  Dwayne   Future Office Visit:  NONE    Pt due for annual, no appt scheduled. Pt already received one month extension. Rx denied.   Tara Valdez RN on 6/3/2024 at 6:01 AM

## 2024-07-11 DIAGNOSIS — N94.6 DYSMENORRHEA: ICD-10-CM

## 2024-07-11 RX ORDER — DESOGESTREL AND ETHINYL ESTRADIOL 0.15-0.03
KIT ORAL
Qty: 84 TABLET | Refills: 0 | Status: SHIPPED | OUTPATIENT
Start: 2024-07-11

## 2024-07-11 NOTE — TELEPHONE ENCOUNTER
Requested Prescriptions   Pending Prescriptions Disp Refills    desogestrel-ethinyl estradiol (APRI) 0.15-30 MG-MCG tablet [Pharmacy Med Name: APRI 28 DAY TABLET] 84 tablet 0     Sig: TAKE 1 TABLET BY MOUTH EVERY DAY IN A CONTINUOUS FASHION       Contraceptives Protocol Failed - 7/11/2024  3:41 PM        Failed - Recent (12 mo) or future (30 days) visit within the authorizing provider's specialty     The patient must have completed an in-person or virtual visit within the past 12 months or has a future visit scheduled within the next 90 days with the authorizing provider s specialty.  Urgent care and e-visits do not quality as an office visit for this protocol.          Passed - Patient is not a current smoker if age is 35 or older        Passed - Medication is active on med list        Passed - Medication indicated for associated diagnosis     Medication is associated with one or more of the following diagnoses:  Contraception  Acne  Dysmenorrhea  Menorrhagia  Amenorrhea  PCOS  Premenstrual Dysphoric Disorder          Passed - No active pregnancy on record        Passed - No positive pregnancy test in past 12 months           Last Written Prescription Date:  4/3/24  Last Fill Quantity: 84,  # refills: 0   Last office visit: 4/29/2022 ; last virtual visit: Visit date not found with prescribing provider:  Dr Rice   Future Office Visit:  8/23/24 w Dr Rice    Has not been seen since 4/29/22  No showed 5/8/2024 visit after an refill extension was granted for 3 months  Rescheduled to 8/23/24    Routing to provider to approve or decline Rx d/t visit history    Tali Lamb RN on 7/11/2024 at 3:45 PM

## 2024-08-04 ENCOUNTER — HEALTH MAINTENANCE LETTER (OUTPATIENT)
Age: 29
End: 2024-08-04

## 2024-09-14 DIAGNOSIS — N94.6 DYSMENORRHEA: ICD-10-CM

## 2024-09-16 RX ORDER — DESOGESTREL AND ETHINYL ESTRADIOL 0.15-0.03
KIT ORAL
Qty: 84 TABLET | Refills: 0 | OUTPATIENT
Start: 2024-09-16

## 2024-09-16 NOTE — TELEPHONE ENCOUNTER
Requested Prescriptions   Pending Prescriptions Disp Refills    desogestrel-ethinyl estradiol (APRI) 0.15-30 MG-MCG tablet [Pharmacy Med Name: APRI 28 DAY TABLET] 84 tablet 0     Sig: TAKE 1 TABLET BY MOUTH EVERY DAY IN A CONTINUOUS FASHION       Contraceptives Protocol Failed - 9/14/2024  7:53 AM        Failed - Recent (12 mo) or future (30 days) visit within the authorizing provider's specialty     The patient must have completed an in-person or virtual visit within the past 12 months or has a future visit scheduled within the next 90 days with the authorizing provider s specialty.  Urgent care and e-visits do not quality as an office visit for this protocol.          Passed - Patient is not a current smoker if age is 35 or older        Passed - Medication is active on med list        Passed - Medication indicated for associated diagnosis     Medication is associated with one or more of the following diagnoses:  Contraception  Acne  Dysmenorrhea  Menorrhagia  Amenorrhea  PCOS  Premenstrual Dysphoric Disorder  Irregular menses  Endometriosis          Passed - No active pregnancy on record        Passed - No positive pregnancy test in past 12 months           Last Written Prescription Date:  7/11/24  Last Fill Quantity: 84,  # refills: 0   Last office visit: 4/29/22 ; last virtual visit: Visit date not found with prescribing provider:  Dwayne    Future Office Visit:  NONE      Rx denied. Has not been seen since 4/29/22  No showed 5/8/24 visit after refill extension granted for 3 months  Rescheduled 8/23/24 and cancelled appt  No further appts scheduled, Rx denied    Dulce Maria Castillo RN on 9/16/2024 at 3:05 PM  [facility] Triage

## 2024-10-15 DIAGNOSIS — F41.1 GAD (GENERALIZED ANXIETY DISORDER): ICD-10-CM

## 2024-10-15 NOTE — TELEPHONE ENCOUNTER
Med: Sertraline    LOV (related): 11/15/23      Due for F/U around: 5/15/24    Next Appt: Not Scheduled. Patient notified via [facility] to schedule appointment before further refills.            11/18/2020     2:50 PM 4/29/2022     2:37 PM 11/15/2023     3:13 PM   PHQ   PHQ-9 Total Score 0 0 4   Q9: Thoughts of better off dead/self-harm past 2 weeks Not at all Not at all Not at all           11/18/2020     2:50 PM 4/29/2022     2:37 PM 11/15/2023     3:13 PM   NICOLE-7 SCORE   Total Score 1 1 16

## 2025-04-24 ENCOUNTER — OFFICE VISIT (OUTPATIENT)
Dept: FAMILY MEDICINE | Facility: CLINIC | Age: 30
End: 2025-04-24

## 2025-04-24 VITALS
WEIGHT: 119 LBS | SYSTOLIC BLOOD PRESSURE: 123 MMHG | OXYGEN SATURATION: 100 % | HEIGHT: 67 IN | BODY MASS INDEX: 18.68 KG/M2 | DIASTOLIC BLOOD PRESSURE: 75 MMHG | HEART RATE: 68 BPM

## 2025-04-24 DIAGNOSIS — R63.6 UNDERWEIGHT (BMI < 18.5): ICD-10-CM

## 2025-04-24 DIAGNOSIS — N91.2 AMENORRHEA: Primary | ICD-10-CM

## 2025-04-24 DIAGNOSIS — Z23 NEED FOR DIPHTHERIA-TETANUS-PERTUSSIS (TDAP) VACCINE: ICD-10-CM

## 2025-04-24 DIAGNOSIS — Z86.59 HISTORY OF EATING DISORDER: ICD-10-CM

## 2025-04-24 LAB
% GRANULOCYTES: 68.9 % (ref 42.2–75.2)
HCG UR QL: NEGATIVE
HCT VFR BLD AUTO: 37.4 % (ref 35–46)
HEMOGLOBIN: 12.6 G/DL (ref 11.8–15.5)
IRON BINDING CAPACITY (ROCHE): 299 UG/DL (ref 240–430)
IRON SATN MFR SERPL: 19 % (ref 15–46)
IRON SERPL-MCNC: 56 UG/DL (ref 37–145)
LYMPHOCYTES NFR BLD AUTO: 24.7 % (ref 20.5–51.1)
MCH RBC QN AUTO: 30 PG (ref 27–31)
MCHC RBC AUTO-ENTMCNC: 33.8 G/DL (ref 33–37)
MCV RBC AUTO: 88.7 FL (ref 80–100)
MONOCYTES NFR BLD AUTO: 6.4 % (ref 1.7–9.3)
PLATELET # BLD AUTO: 211 K/UL (ref 140–450)
RBC # BLD AUTO: 4.22 X10/CMM (ref 3.7–5.2)
TSH SERPL DL<=0.005 MIU/L-ACNC: 1.63 UIU/ML (ref 0.3–4.2)
WBC # BLD AUTO: 6.1 X10/CMM (ref 3.8–11)

## 2025-04-24 PROCEDURE — 3074F SYST BP LT 130 MM HG: CPT | Performed by: FAMILY MEDICINE

## 2025-04-24 PROCEDURE — 36415 COLL VENOUS BLD VENIPUNCTURE: CPT | Performed by: FAMILY MEDICINE

## 2025-04-24 PROCEDURE — 3078F DIAST BP <80 MM HG: CPT | Performed by: FAMILY MEDICINE

## 2025-04-24 NOTE — PROGRESS NOTES
"SUBJECTIVE:    Rani Guan, is a 29 year old female presenting for the below:     Amenorrhea with very occasional non cyclical spotting since stopping combined OCP September 2024. Recalls menses irregular prior to starting contraception. Took UPT couple months ago: negative. No cyclical pelvic cramping/ pain or dyspareunia.     Is sexually active with . No current contraception. Plans on actively trying for conception. Has wondered if menses will return if gains weight. (PMHx of eating disorder)     Has increased strength training and reduced higher intensity exerise in last 1-2 months. Has increased nutrition : eating more regularly. Was skipping lunch on occasion. Has discussed with  and mother : both aware of prior eating disorder and very supportive.     Has exercise science major. Feels well informed.  Endorses good relationship with food at present and denies any triggering of prior eating disorder symptoms with increased consumption of calories. Focusing on macro nutrients.     OBJECTIVE:  Vitals:    04/24/25 1325   BP: 123/75   Pulse: 68   SpO2: 100%   Weight: 54 kg (119 lb)   Height: 1.702 m (5' 7\")    Body mass index is 18.64 kg/m .  General: no acute distress, cooperative with exam.  Psych: mental status normal, mood and affect appropriate.      Results for orders placed or performed in visit on 04/24/25   HCG urine ([facility])     Status: None   Result Value Ref Range    HCG Qual Urine Negative Negative       ASSESSMENT / PLAN:        Amenorrhea  History of eating disorder  Underweight (BMI < 18.5)  Urine preg test negative today. Plans to start actively trying to conceive.   Discussed suspect amenorrhea related to lower BMI. Feels has current good relationship with food and feels able to increase healthy calories (healthy fats, proteins) without triggering prior dysregulated relationship with food.   Mother and  both fully aware and supportive.   Screen for lab evidence of " alternative causes of amenorrhea.   Is taking pre josy vitamin.  -     HCG urine ([facility])  -     TSH; Future  -     CBC with Diff/Plt ([facility])  -     Iron & Iron Binding Capacity; Future  -     Ferritin; Future  -     Follicle stimulating hormone; Future  -     Luteinizing Hormone; Future    Need for diphtheria-tetanus-pertussis (Tdap) vaccine  -     TDAP 7+ (ADACEL,BOOSTRIX)  -     VACCINE ADMINISTRATION, INITIAL    The longitudinal plan of care for the diagnosis(es)/condition(s) as documented were addressed during this visit. Due to the added complexity in care, I will continue to support Rani in the subsequent management and with ongoing continuity of care.

## 2025-06-17 ENCOUNTER — RESULTS FOLLOW-UP (OUTPATIENT)
Dept: FAMILY MEDICINE | Facility: CLINIC | Age: 30
End: 2025-06-17